# Patient Record
Sex: MALE | Race: WHITE | Employment: UNEMPLOYED | URBAN - METROPOLITAN AREA
[De-identification: names, ages, dates, MRNs, and addresses within clinical notes are randomized per-mention and may not be internally consistent; named-entity substitution may affect disease eponyms.]

---

## 2019-06-12 ENCOUNTER — OFFICE VISIT (OUTPATIENT)
Dept: INTERNAL MEDICINE CLINIC | Facility: CLINIC | Age: 39
End: 2019-06-12
Payer: COMMERCIAL

## 2019-06-12 VITALS
HEIGHT: 74 IN | WEIGHT: 249 LBS | TEMPERATURE: 98 F | DIASTOLIC BLOOD PRESSURE: 84 MMHG | HEART RATE: 85 BPM | SYSTOLIC BLOOD PRESSURE: 127 MMHG | BODY MASS INDEX: 31.95 KG/M2

## 2019-06-12 DIAGNOSIS — Z85.840 HISTORY OF MALIGNANT MELANOMA OF EYE: ICD-10-CM

## 2019-06-12 DIAGNOSIS — G47.30 SLEEP APNEA, UNSPECIFIED TYPE: ICD-10-CM

## 2019-06-12 DIAGNOSIS — Z00.00 ANNUAL PHYSICAL EXAM: Primary | ICD-10-CM

## 2019-06-12 DIAGNOSIS — G56.00 MILD CARPAL TUNNEL SYNDROME, UNSPECIFIED LATERALITY: ICD-10-CM

## 2019-06-12 DIAGNOSIS — Z97.0 EYE GLOBE PROSTHESIS: ICD-10-CM

## 2019-06-12 DIAGNOSIS — E66.9 OBESITY (BMI 30-39.9): ICD-10-CM

## 2019-06-12 PROBLEM — M51.26 LUMBAR HERNIATED DISC: Status: ACTIVE | Noted: 2019-06-12

## 2019-06-12 PROCEDURE — 99385 PREV VISIT NEW AGE 18-39: CPT | Performed by: INTERNAL MEDICINE

## 2019-06-12 NOTE — PROGRESS NOTES
HPI:    Patient ID: Jerry Hunt is a 41year old male. Chief Complaint: Physical (annual px)      HPI    Prior physician care: last saw physician 5 years ago. No active complaints, feels well.    Has episodes of apnea, wakes him at night, wakes 1-2 Gastrointestinal: Positive for heartburn (due to diet). Negative for abdominal pain, diarrhea, constipation, blood in stool and rectal pain. Endocrine: Negative for cold intolerance, heat intolerance, polydipsia, polyphagia and polyuria.    Genitourinary: Smoking status: Never Smoker      Smokeless tobacco: Never Used    Alcohol use: Not Currently    Drug use: Not Currently       No Known Allergies         Depression Screening (PHQ-2/PHQ-9): Over the LAST 2 WEEKS   Little interest or pleasure in doing t Mouth/Throat: Oropharynx is clear and moist. No oropharyngeal exudate. Eyes: Conjunctivae are normal. Right eye exhibits no discharge. Left eye exhibits no discharge. No scleral icterus.    Left Eye: EOM, PERRL; No vision or EOM in right eye   Neck: Southold Found - referral to sleep medicine for sleep study.  Recommended pt lose weight     4) History of malignant melanoma of RIGHT eye  5) Eye globe prosthesis- RIGHT  -     DERM - INTERNAL  Plan  - stable, requested he sign CADEN to provide records from [facility] for review Preventive measures and further education provided to patient in after visit summary. Potential medication side effects discussed. All questions answered. Patient understands and agrees to follow directions and advice.  Patient [facility] Scre[facility] tests and vaccines are an important part of managing your health. A screening test is done to find possible disorders or diseases in people who don't have any symptoms.  The goal is to find a disease early so lifestyle changes can be made and you Chickenpox (varicella) All men in this age group who have no record of this infection or vaccine 2 doses; the second dose should be given at least 4 weeks after the first dose   Hepatitis A Men at increased risk for infection – talk with your healthcare pr Sexually transmitted infection prevention Men who are sexually active At routine exams   Skin cancer Prevention of skin cancer in fair-skinned adults through age 25 At routine exams   1Those who are 25years of age, who are not up-to-date on their childhoo · Use non-sugar sweeteners. Stevia, aspartame, and sucralose can satisfy a sweet tooth without adding calories. · Swap out sugar-filled soda and other drinks. Buy sugar-free or low-calorie beverages. Remember water is always the best choice.   · Read label · Tofu, stir-fried without salt  · Unsalted fresh fruit and vegetables, or frozen or canned fruit and vegetables with no added salt  Stay away from:  · Lunch or deli meat that is cured or smoked  · Cheese  · Tomato juice and ketchup  · Canned vegetables, s · Half a cup cooked rice, pasta or cereal  Best choices: Whole grains and any grains high in fiber.  Vegetables  Servings: 4 to 5 a day  A serving is:  · 1 cup raw leafy vegetable  · Half a cup cut-up raw or cooked vegetable  · Half a cup vegetable juice  B Best choices: Dried fruit can be a satisfying sweet. Choose low-fat sweets.  And watch your serving sizes!      For more on the DASH eating plan, visit:  www.nhlbi.nih.gov/health/health-topics/topics/dash   Date Last Reviewed: 6/1/2016  © 5321-5513 [employer]

## 2019-06-12 NOTE — PATIENT INSTRUCTIONS
Prevention Guidelines, Men Ages 25 to 44  Screening tests and vaccines are an important part of managing your health. A screening test is done to find possible disorders or diseases in people who don't have any symptoms.  The goal is to find a disease ear Vaccines Who needs it How often   Chickenpox (varicella) All men in this age group who have no record of this infection or vaccine 2 doses; the second dose should be given at least 4 weeks after the first dose   Hepatitis A Men at increased risk for infect Sexually transmitted infection prevention Men who are sexually active At routine exams   Skin cancer Prevention of skin cancer in fair-skinned adults through age 25 At routine exams   1Those who are 25years of age, who are not up-to-date on their childhoo · Use non-sugar sweeteners. Stevia, aspartame, and sucralose can satisfy a sweet tooth without adding calories. · Swap out sugar-filled soda and other drinks. Buy sugar-free or low-calorie beverages. Remember water is always the best choice.   · Read label · Tofu, stir-fried without salt  · Unsalted fresh fruit and vegetables, or frozen or canned fruit and vegetables with no added salt  Stay away from:  · Lunch or deli meat that is cured or smoked  · Cheese  · Tomato juice and ketchup  · Canned vegetables, s · Half a cup cooked rice, pasta or cereal  Best choices: Whole grains and any grains high in fiber.  Vegetables  Servings: 4 to 5 a day  A serving is:  · 1 cup raw leafy vegetable  · Half a cup cut-up raw or cooked vegetable  · Half a cup vegetable juice  B Best choices: Dried fruit can be a satisfying sweet. Choose low-fat sweets.  And watch your serving sizes!      For more on the DASH eating plan, visit:  www.nhlbi.nih.gov/health/health-topics/topics/dash   Date Last Reviewed: 6/1/2016  © 4376-0090 The [employer]

## 2019-06-14 ENCOUNTER — LAB ENCOUNTER (OUTPATIENT)
Dept: LAB | Age: 39
End: 2019-06-14
Attending: INTERNAL MEDICINE
Payer: COMMERCIAL

## 2019-06-14 DIAGNOSIS — Z00.00 ANNUAL PHYSICAL EXAM: ICD-10-CM

## 2019-06-14 PROCEDURE — 36415 COLL VENOUS BLD VENIPUNCTURE: CPT

## 2019-06-14 PROCEDURE — 85025 COMPLETE CBC W/AUTO DIFF WBC: CPT

## 2019-06-14 PROCEDURE — 83036 HEMOGLOBIN GLYCOSYLATED A1C: CPT

## 2019-06-14 PROCEDURE — 82306 VITAMIN D 25 HYDROXY: CPT

## 2019-06-14 PROCEDURE — 80053 COMPREHEN METABOLIC PANEL: CPT

## 2019-06-14 PROCEDURE — 86803 HEPATITIS C AB TEST: CPT

## 2019-06-14 PROCEDURE — 80061 LIPID PANEL: CPT

## 2019-06-14 PROCEDURE — 87340 HEPATITIS B SURFACE AG IA: CPT

## 2019-06-19 DIAGNOSIS — E83.52 HYPERCALCEMIA: ICD-10-CM

## 2019-06-19 DIAGNOSIS — E78.1 HYPERTRIGLYCERIDEMIA: ICD-10-CM

## 2019-06-19 DIAGNOSIS — E55.9 VITAMIN D DEFICIENCY: Primary | ICD-10-CM

## 2019-07-24 ENCOUNTER — OFFICE VISIT (OUTPATIENT)
Dept: PULMONOLOGY | Facility: CLINIC | Age: 39
End: 2019-07-24
Payer: COMMERCIAL

## 2019-07-24 VITALS
DIASTOLIC BLOOD PRESSURE: 88 MMHG | RESPIRATION RATE: 18 BRPM | OXYGEN SATURATION: 99 % | HEIGHT: 74 IN | SYSTOLIC BLOOD PRESSURE: 133 MMHG | WEIGHT: 248 LBS | HEART RATE: 111 BPM | BODY MASS INDEX: 31.83 KG/M2

## 2019-07-24 DIAGNOSIS — G47.33 OSA (OBSTRUCTIVE SLEEP APNEA): Primary | ICD-10-CM

## 2019-07-24 PROCEDURE — 99203 OFFICE O/P NEW LOW 30 MIN: CPT | Performed by: INTERNAL MEDICINE

## 2019-07-24 NOTE — PROGRESS NOTES
[facility], [facility], [facility]    Report of Consultation    Caity Lunsford Patient Status:  Outpatient    1980 MRN [id number]   Location [address], [address] [place] [address] airway , Mallampati  class IV score   Nose : normal   Chest : CTA(B)   Heart : s1 s2 RRR no G/M   Ext : no edema   A, A,Ox 3  No focal deficit     Results:     Laboratory Data:  Lab Results   Component Value Date    WBC 6.7 06/14/2019    HGB 15.8 06/14/201

## 2019-08-13 ENCOUNTER — TELEPHONE (OUTPATIENT)
Dept: PULMONOLOGY | Facility: CLINIC | Age: 39
End: 2019-08-13

## 2019-08-13 DIAGNOSIS — G47.33 OSA (OBSTRUCTIVE SLEEP APNEA): Primary | ICD-10-CM

## 2019-08-13 NOTE — TELEPHONE ENCOUNTER
Patients insurance has denied the auth request for the in lab sleep study as not meeting medical policy criteria. An order for a home sleep study can be placed if you agree.      Thank you,  [facility]

## 2019-08-15 ENCOUNTER — TELEPHONE (OUTPATIENT)
Dept: PULMONOLOGY | Facility: CLINIC | Age: 39
End: 2019-08-15

## 2019-08-15 DIAGNOSIS — G47.33 OSA (OBSTRUCTIVE SLEEP APNEA): Primary | ICD-10-CM

## 2019-08-15 NOTE — TELEPHONE ENCOUNTER
----- Message from Jer Wolf MD sent at 8/14/2019  1:54 PM CDT -----  Hi Pulmo Team,    My brother's sleep study was denied by his insurance. Maybe he needs a home sleep study instead? Thank you!

## 2019-08-16 NOTE — TELEPHONE ENCOUNTER
Pt informed of new orders for home sleep study/denial of in lab sleep study and to call sleep center to schedule. Pt verbalized understanding and states he has sleep center's phone #.

## 2019-08-16 NOTE — TELEPHONE ENCOUNTER
Ashley Thomas with me to have home sleep study for Danita Brown   Please let mow know if any other problems to have home sleep study   Thanks

## 2019-11-29 ENCOUNTER — OFFICE VISIT (OUTPATIENT)
Dept: INTERNAL MEDICINE CLINIC | Facility: CLINIC | Age: 39
End: 2019-11-29
Payer: COMMERCIAL

## 2019-11-29 VITALS
SYSTOLIC BLOOD PRESSURE: 130 MMHG | BODY MASS INDEX: 32.85 KG/M2 | TEMPERATURE: 97 F | HEART RATE: 99 BPM | HEIGHT: 74 IN | DIASTOLIC BLOOD PRESSURE: 86 MMHG | WEIGHT: 256 LBS | RESPIRATION RATE: 12 BRPM

## 2019-11-29 DIAGNOSIS — M54.42 ACUTE LEFT-SIDED LOW BACK PAIN WITH LEFT-SIDED SCIATICA: Primary | ICD-10-CM

## 2019-11-29 RX ORDER — IBUPROFEN 800 MG/1
800 TABLET ORAL EVERY 6 HOURS PRN
COMMUNITY
End: 2019-12-09

## 2019-11-29 RX ORDER — CYCLOBENZAPRINE HCL 10 MG
10 TABLET ORAL NIGHTLY
Qty: 15 TABLET | Refills: 0 | Status: SHIPPED | OUTPATIENT
Start: 2019-11-29 | End: 2019-12-14

## 2019-11-29 RX ORDER — HYDROCODONE BITARTRATE AND ACETAMINOPHEN 5; 325 MG/1; MG/1
1 TABLET ORAL EVERY 6 HOURS PRN
Qty: 20 TABLET | Refills: 0 | Status: SHIPPED | OUTPATIENT
Start: 2019-11-29 | End: 2019-12-09

## 2019-11-29 NOTE — PROGRESS NOTES
HPI:    Patient ID: Jun Puga is a 42year old male. Low Back Pain   This is a new (pt complained of low back pain after bending down ) problem. The current episode started yesterday. The problem occurs constantly.  The problem has been waxing an Right eye exhibits no discharge. Left eye exhibits no discharge. No scleral icterus. Neck: Normal range of motion. Neck supple. No JVD present. No thyromegaly present. Cardiovascular: Normal rate, regular rhythm and normal heart sounds.    No murmur hea Referrals:  None       [facility][id number]

## 2019-12-02 ENCOUNTER — OFFICE VISIT (OUTPATIENT)
Dept: NEUROLOGY | Facility: CLINIC | Age: 39
End: 2019-12-02
Payer: COMMERCIAL

## 2019-12-02 ENCOUNTER — TELEPHONE (OUTPATIENT)
Dept: NEUROLOGY | Facility: CLINIC | Age: 39
End: 2019-12-02

## 2019-12-02 VITALS
RESPIRATION RATE: 16 BRPM | SYSTOLIC BLOOD PRESSURE: 134 MMHG | HEART RATE: 68 BPM | BODY MASS INDEX: 32.08 KG/M2 | WEIGHT: 250 LBS | HEIGHT: 74 IN | DIASTOLIC BLOOD PRESSURE: 84 MMHG

## 2019-12-02 DIAGNOSIS — M54.16 LEFT LUMBAR RADICULITIS: Primary | ICD-10-CM

## 2019-12-02 PROCEDURE — 99244 OFF/OP CNSLTJ NEW/EST MOD 40: CPT | Performed by: PHYSICAL MEDICINE & REHABILITATION

## 2019-12-02 RX ORDER — CELECOXIB 100 MG/1
100 CAPSULE ORAL 2 TIMES DAILY
Qty: 60 CAPSULE | Refills: 0 | Status: SHIPPED | OUTPATIENT
Start: 2019-12-02 | End: 2021-03-09 | Stop reason: ALTCHOICE

## 2019-12-02 RX ORDER — HYDROCODONE BITARTRATE AND ACETAMINOPHEN 7.5; 325 MG/1; MG/1
1 TABLET ORAL EVERY 8 HOURS PRN
Qty: 42 TABLET | Refills: 0 | Status: SHIPPED | OUTPATIENT
Start: 2019-12-04

## 2019-12-02 NOTE — PATIENT INSTRUCTIONS
Spend 5 minutes per day lying flat on your belly at least twice a day to take pressure off the discs of the lower back.

## 2019-12-02 NOTE — TELEPHONE ENCOUNTER
Dr. Jayme Cabrera,  Message below is stating that the pharmacy did not receive the Celebrex prescription.

## 2019-12-02 NOTE — TELEPHONE ENCOUNTER
Glenda for [facility] BS Online for authorization of approval for MRI L-spine wo cpt code 97329. Approval was given with Authorization Number: [id number] effective 12/02/19 to 01/16/20. Will call Pt. to inform.  Pt. informed of approval. He will call to schedule

## 2019-12-02 NOTE — H&P
Ban Nunez    History and Physical    Andrea Daniel Patient Status:  No patient class for patient encounter    1980 MRN [id number]   Location [facility]  Attending No att. providers found   [facility] Day # melanoma     Family History   Problem Relation Age of Onset   • Heart Disorder Father    • Hypertension Father      Social History:  Social History    Tobacco Use      Smoking status: Never Smoker      Smokeless tobacco: Never Used    Alcohol use: Not Curr for left lower back and leg pain. Right supine SLR + at 45 degrees for left sided lower back pain. Rest of testing deferred due to reproduction of symptoms with bilateral SLR. Neurological:   Strength: normal muscle bulk in b/l Le.  Mildly gives way with

## 2019-12-05 ENCOUNTER — HOSPITAL ENCOUNTER (OUTPATIENT)
Dept: MRI IMAGING | Age: 39
Discharge: HOME | End: 2019-12-05
Attending: PHYSICAL MEDICINE & REHABILITATION
Payer: COMMERCIAL

## 2019-12-05 DIAGNOSIS — M54.16 LEFT LUMBAR RADICULITIS: ICD-10-CM

## 2019-12-05 PROCEDURE — 72148 MRI LUMBAR SPINE W/O DYE: CPT | Performed by: PHYSICAL MEDICINE & REHABILITATION

## 2019-12-09 ENCOUNTER — TELEPHONE (OUTPATIENT)
Dept: ENDOCRINOLOGY CLINIC | Facility: CLINIC | Age: 39
End: 2019-12-09

## 2019-12-09 ENCOUNTER — OFFICE VISIT (OUTPATIENT)
Dept: NEUROLOGY | Facility: CLINIC | Age: 39
End: 2019-12-09
Payer: COMMERCIAL

## 2019-12-09 ENCOUNTER — TELEPHONE (OUTPATIENT)
Dept: NEUROLOGY | Facility: CLINIC | Age: 39
End: 2019-12-09

## 2019-12-09 ENCOUNTER — NURSE ONLY (OUTPATIENT)
Dept: ENDOCRINOLOGY CLINIC | Facility: CLINIC | Age: 39
End: 2019-12-09
Payer: COMMERCIAL

## 2019-12-09 VITALS
SYSTOLIC BLOOD PRESSURE: 118 MMHG | HEIGHT: 74 IN | HEART RATE: 88 BPM | WEIGHT: 250 LBS | DIASTOLIC BLOOD PRESSURE: 78 MMHG | RESPIRATION RATE: 16 BRPM | BODY MASS INDEX: 32.08 KG/M2

## 2019-12-09 DIAGNOSIS — M54.16 LEFT LUMBAR RADICULITIS: ICD-10-CM

## 2019-12-09 DIAGNOSIS — M51.27 HERNIATED NUCLEUS PULPOSUS, L5-S1, LEFT: Primary | ICD-10-CM

## 2019-12-09 PROCEDURE — 99214 OFFICE O/P EST MOD 30 MIN: CPT | Performed by: PHYSICAL MEDICINE & REHABILITATION

## 2019-12-09 RX ORDER — SEMAGLUTIDE 1.34 MG/ML
0.5 INJECTION, SOLUTION SUBCUTANEOUS WEEKLY
Qty: 1.5 ML | Refills: 2 | Status: SHIPPED | OUTPATIENT
Start: 2019-12-09 | End: 2020-02-05

## 2019-12-09 NOTE — TELEPHONE ENCOUNTER
Patient presented in clinic to meet with CDE and discuss low CHO diet. He is concerned about compliance.   Start Ozempic 0.25mg SQ weekly for one month then increase to 0.5mg SQ weekly,  Verbalized understanding of risks and benefits    Demonstrated inject

## 2019-12-09 NOTE — TELEPHONE ENCOUNTER
----- Message from Pam Snow DO sent at 12/6/2019 12:53 PM CST -----  Please let the patient know he has a mild disc bulge on the left side that passes close and is likely irritating the nerve.  He should follow up so we can discuss the results and marisol

## 2019-12-09 NOTE — PROGRESS NOTES
HPI:    Patient ID: Inda Lanes is a 42year old male. 51-year-old male presents for follow-up. Doing much better since last time I saw him.   He continues to experience soreness in the upper portion of the lumbar spine that radiates to the buttoc Normocephalic and atraumatic.    Eyes: Conjunctivae and EOM are normal. Musculoskeletal:      Comments: Lumbar range of motion: Pain with lumbar flexion and lumbar extension, one not worse than the other    Palpation: Tenderness to palpation upper left lumb interventional procedure at this time. If the symptoms worsen or fail to improve consider left S1 transforaminal epidural steroid injection under fluoroscopy. Continue current medications for now.   He can finish of the last 2 doses of the Flexeril, after

## 2019-12-10 ENCOUNTER — TELEPHONE (OUTPATIENT)
Dept: ENDOCRINOLOGY CLINIC | Facility: CLINIC | Age: 39
End: 2019-12-10

## 2019-12-10 NOTE — TELEPHONE ENCOUNTER
Current Outpatient Medications   Medication Sig Dispense Refill   • OZEMPIC, 0.25 OR 0.5 MG/DOSE, 2 MG/1.5ML Subcutaneous Solution Pen-injector Inject 0.5 mg into the skin once a week.  1.5 mL 2     PA request call [phone] ID# [id number]

## 2019-12-11 ENCOUNTER — OFFICE VISIT (OUTPATIENT)
Dept: SLEEP CENTER | Age: 39
End: 2019-12-11
Attending: INTERNAL MEDICINE
Payer: COMMERCIAL

## 2019-12-11 DIAGNOSIS — G47.33 OSA (OBSTRUCTIVE SLEEP APNEA): Primary | ICD-10-CM

## 2019-12-11 PROCEDURE — 95806 SLEEP STUDY UNATT&RESP EFFT: CPT

## 2019-12-11 NOTE — TELEPHONE ENCOUNTER
Contacted insurance. PA to be completed by fax form. Form sent to office this morning. Will complete once received.

## 2019-12-12 ENCOUNTER — MED REC SCAN ONLY (OUTPATIENT)
Dept: NEUROLOGY | Facility: CLINIC | Age: 39
End: 2019-12-12

## 2019-12-16 ENCOUNTER — OFFICE VISIT (OUTPATIENT)
Dept: PULMONOLOGY | Facility: CLINIC | Age: 39
End: 2019-12-16
Payer: COMMERCIAL

## 2019-12-16 VITALS
BODY MASS INDEX: 31.44 KG/M2 | HEART RATE: 93 BPM | WEIGHT: 245 LBS | DIASTOLIC BLOOD PRESSURE: 89 MMHG | HEIGHT: 74 IN | OXYGEN SATURATION: 97 % | SYSTOLIC BLOOD PRESSURE: 117 MMHG

## 2019-12-16 DIAGNOSIS — G47.33 OSA (OBSTRUCTIVE SLEEP APNEA): Primary | ICD-10-CM

## 2019-12-16 PROCEDURE — 99212 OFFICE O/P EST SF 10 MIN: CPT | Performed by: INTERNAL MEDICINE

## 2019-12-16 NOTE — PROCEDURES
[facility]  Accredited by the [place] of Sleep Medicine (AASM)    PATIENT'S NAME: Anamaria Gnog   ATTENDING PHYSICIAN: Misha Dorman MD   REFERRING PHYSICIAN: Ade Perez.  Ciara Manzano MD   PATIENT ACCOUNT #: [id number] LOCATION:  alcohol. 4.   Avoid sedating drug. 5.   Patient should not drive if at all sleepy. Please do not hesitate to contact me if there is any question whatsoever regarding interpretation of this study.     Dictated By Yfn Zimmer MD  d: 12/14/2019 12

## 2019-12-16 NOTE — TELEPHONE ENCOUNTER
PA for Ozempic has been denied. Must meet criteria: has type 2 diabetes and tried/failed metformin, sulfonylurea or insulin.

## 2019-12-16 NOTE — PROGRESS NOTES
HPI:    Patient ID: Ascencion Wick is a 42year old male.     HPI  Overall doing well still with symptoms of CHERRY  Daytime sleepiness and fatigue and history of snoring at night  No other symptoms like a chest pain or shortness of breath or edema lower ex cycles and good sleep hygiene   - diet and exercise and weight reduction  -avoid sedative /narcotics /alcohol   - avoid driving of working on heavy machinery if sleepy     Follow-up with me in 2 to 3 months                      Meds This Visit:  Requested

## 2020-01-02 ENCOUNTER — MED REC SCAN ONLY (OUTPATIENT)
Dept: NEUROLOGY | Facility: CLINIC | Age: 40
End: 2020-01-02

## 2020-02-05 ENCOUNTER — OFFICE VISIT (OUTPATIENT)
Dept: SURGERY | Facility: CLINIC | Age: 40
End: 2020-02-05
Payer: COMMERCIAL

## 2020-02-05 VITALS
HEIGHT: 74 IN | WEIGHT: 238 LBS | OXYGEN SATURATION: 100 % | HEART RATE: 76 BPM | DIASTOLIC BLOOD PRESSURE: 83 MMHG | SYSTOLIC BLOOD PRESSURE: 121 MMHG | BODY MASS INDEX: 30.54 KG/M2

## 2020-02-05 DIAGNOSIS — E78.2 HYPERCHOLESTEROLEMIA WITH HYPERTRIGLYCERIDEMIA: ICD-10-CM

## 2020-02-05 DIAGNOSIS — Z99.89 OSA ON CPAP: Primary | ICD-10-CM

## 2020-02-05 DIAGNOSIS — G47.33 OSA ON CPAP: Primary | ICD-10-CM

## 2020-02-05 DIAGNOSIS — E66.9 OBESITY (BMI 30-39.9): ICD-10-CM

## 2020-02-05 PROCEDURE — 99203 OFFICE O/P NEW LOW 30 MIN: CPT | Performed by: INTERNAL MEDICINE

## 2020-02-08 ENCOUNTER — MED REC SCAN ONLY (OUTPATIENT)
Dept: NEUROLOGY | Facility: CLINIC | Age: 40
End: 2020-02-08

## 2020-02-08 ENCOUNTER — OCC HEALTH (OUTPATIENT)
Dept: NEUROLOGY | Facility: CLINIC | Age: 40
End: 2020-02-08

## 2020-02-08 NOTE — PROGRESS NOTES
Physical therapy note summary signed and faxed back to Mike Mederos [phone] on 1/28/2020.  Sent to scanning 2/8/2020

## 2020-02-10 ENCOUNTER — TELEPHONE (OUTPATIENT)
Dept: INTERNAL MEDICINE CLINIC | Facility: CLINIC | Age: 40
End: 2020-02-10

## 2020-02-11 NOTE — TELEPHONE ENCOUNTER
CSS- Per Dr. Peter Glaser Please call pt and schedule appt for px, MD will order labs at that time. Thanks!            Author: Sailaja Pinto MD Service: Jose Alberto Simons Author Type: Physician   Filed: 2/5/2020  4:09 PM Encounter Date: 2/5/2020 Status: Signed   : Pepper Daniel

## 2020-02-11 NOTE — TELEPHONE ENCOUNTER
Called patient, unable to leave message per patient request on chart. My Chart message was sent to patient.

## 2020-02-18 ENCOUNTER — OFFICE VISIT (OUTPATIENT)
Dept: INTERNAL MEDICINE CLINIC | Facility: CLINIC | Age: 40
End: 2020-02-18
Payer: COMMERCIAL

## 2020-02-18 ENCOUNTER — APPOINTMENT (OUTPATIENT)
Dept: LAB | Age: 40
End: 2020-02-18
Attending: INTERNAL MEDICINE
Payer: COMMERCIAL

## 2020-02-18 VITALS
WEIGHT: 237 LBS | DIASTOLIC BLOOD PRESSURE: 74 MMHG | BODY MASS INDEX: 30.42 KG/M2 | HEART RATE: 92 BPM | TEMPERATURE: 97 F | SYSTOLIC BLOOD PRESSURE: 119 MMHG | HEIGHT: 74 IN

## 2020-02-18 DIAGNOSIS — Z00.00 ANNUAL PHYSICAL EXAM: ICD-10-CM

## 2020-02-18 DIAGNOSIS — Z23 NEED FOR INFLUENZA VACCINATION: ICD-10-CM

## 2020-02-18 DIAGNOSIS — E78.2 HYPERCHOLESTEROLEMIA WITH HYPERTRIGLYCERIDEMIA: ICD-10-CM

## 2020-02-18 DIAGNOSIS — Z00.00 ANNUAL PHYSICAL EXAM: Primary | ICD-10-CM

## 2020-02-18 LAB
ALBUMIN SERPL-MCNC: 3.8 G/DL (ref 3.4–5)
ALBUMIN/GLOB SERPL: 1.1 {RATIO} (ref 1–2)
ALP LIVER SERPL-CCNC: 52 U/L (ref 45–117)
ALT SERPL-CCNC: 28 U/L (ref 16–61)
ANION GAP SERPL CALC-SCNC: 3 MMOL/L (ref 0–18)
AST SERPL-CCNC: 10 U/L (ref 15–37)
BILIRUB SERPL-MCNC: 0.4 MG/DL (ref 0.1–2)
BUN BLD-MCNC: 18 MG/DL (ref 7–18)
BUN/CREAT SERPL: 17.3 (ref 10–20)
CALCIUM BLD-MCNC: 9.4 MG/DL (ref 8.5–10.1)
CHLORIDE SERPL-SCNC: 107 MMOL/L (ref 98–112)
CHOLEST SMN-MCNC: 202 MG/DL (ref ?–200)
CO2 SERPL-SCNC: 30 MMOL/L (ref 21–32)
CREAT BLD-MCNC: 1.04 MG/DL (ref 0.7–1.3)
DEPRECATED RDW RBC AUTO: 41.7 FL (ref 35.1–46.3)
ERYTHROCYTE [DISTWIDTH] IN BLOOD BY AUTOMATED COUNT: 12.7 % (ref 11–15)
EST. AVERAGE GLUCOSE BLD GHB EST-MCNC: 97 MG/DL (ref 68–126)
GLOBULIN PLAS-MCNC: 3.5 G/DL (ref 2.8–4.4)
GLUCOSE BLD-MCNC: 95 MG/DL (ref 70–99)
HBA1C MFR BLD HPLC: 5 % (ref ?–5.7)
HCT VFR BLD AUTO: 45.4 % (ref 39–53)
HDLC SERPL-MCNC: 29 MG/DL (ref 40–59)
HGB BLD-MCNC: 15.2 G/DL (ref 13–17.5)
LDLC SERPL CALC-MCNC: 136 MG/DL (ref ?–100)
M PROTEIN MFR SERPL ELPH: 7.3 G/DL (ref 6.4–8.2)
MCH RBC QN AUTO: 30.2 PG (ref 26–34)
MCHC RBC AUTO-ENTMCNC: 33.5 G/DL (ref 31–37)
MCV RBC AUTO: 90.1 FL (ref 80–100)
NONHDLC SERPL-MCNC: 173 MG/DL (ref ?–130)
OSMOLALITY SERPL CALC.SUM OF ELEC: 292 MOSM/KG (ref 275–295)
PATIENT FASTING Y/N/NP: YES
PATIENT FASTING Y/N/NP: YES
PLATELET # BLD AUTO: 168 10(3)UL (ref 150–450)
POTASSIUM SERPL-SCNC: 4.8 MMOL/L (ref 3.5–5.1)
RBC # BLD AUTO: 5.04 X10(6)UL (ref 4.3–5.7)
SODIUM SERPL-SCNC: 140 MMOL/L (ref 136–145)
TRIGL SERPL-MCNC: 186 MG/DL (ref 30–149)
TSI SER-ACNC: 1.93 MIU/ML (ref 0.36–3.74)
VLDLC SERPL CALC-MCNC: 37 MG/DL (ref 0–30)
WBC # BLD AUTO: 5.4 X10(3) UL (ref 4–11)

## 2020-02-18 PROCEDURE — 85027 COMPLETE CBC AUTOMATED: CPT

## 2020-02-18 PROCEDURE — 80053 COMPREHEN METABOLIC PANEL: CPT

## 2020-02-18 PROCEDURE — 82306 VITAMIN D 25 HYDROXY: CPT

## 2020-02-18 PROCEDURE — 84443 ASSAY THYROID STIM HORMONE: CPT

## 2020-02-18 PROCEDURE — 36415 COLL VENOUS BLD VENIPUNCTURE: CPT

## 2020-02-18 PROCEDURE — 99395 PREV VISIT EST AGE 18-39: CPT | Performed by: INTERNAL MEDICINE

## 2020-02-18 PROCEDURE — 80061 LIPID PANEL: CPT

## 2020-02-18 PROCEDURE — 90686 IIV4 VACC NO PRSV 0.5 ML IM: CPT | Performed by: INTERNAL MEDICINE

## 2020-02-18 PROCEDURE — 83036 HEMOGLOBIN GLYCOSYLATED A1C: CPT

## 2020-02-18 PROCEDURE — 90471 IMMUNIZATION ADMIN: CPT | Performed by: INTERNAL MEDICINE

## 2020-02-18 NOTE — PROGRESS NOTES
History of Present Illness   Patient ID: Sherri Pearce is a 42year old male.   Chief Complaint: Physical      Sherri Pearce is a pleasant 42year old male who presents for annual physical exam.   1.  He is currently seeing bariatrics Dr. Daisy Benavidez, he Constitutional: He is oriented to person, place, and time. He appears well-developed and well-nourished. No distress. HENT:   Head: Normocephalic and atraumatic.    Right Ear: Hearing, tympanic membrane, external ear and ear canal normal.   Left Ear: Hear TP 7.7 06/14/2019    ALB 4.1 06/14/2019    GLOBULIN 3.6 06/14/2019     06/14/2019    K 4.4 06/14/2019     06/14/2019    CO2 28.0 06/14/2019     Lab Results   Component Value Date     06/14/2019    A1C 5.6 06/14/2019     Lab Results   Co Medication Sig Dispense Refill   • Cyclobenzaprine HCl (FLEXERIL OR) Take by mouth. • HYDROcodone-acetaminophen 7.5-325 MG Oral Tab Take 1 tablet by mouth every 8 (eight) hours as needed for Pain.  (Patient not taking: Reported on 12/16/2019 ) 42 tablet Patient understands and agrees to follow directions and advice.       ----------------------------------------- PATIENT INSTRUCTIONS-----------------------------------------     Patient Instructions       Prevention Guidelines, Men Ages 25 to 44  Screening Tuberculosis Men at increased risk for infection – talk with your healthcare provider Check with your healthcare provider   Vision All men in this age group Every 5 to 8 years if no risk factors for eye disease   Vaccines Who needs it How often   Chickenp Diet and exercise Overweight or obese people When diagnosed, and then at routine exams   Use of tobacco and the health effects it can cause All men in this age group Every visit   Sexually transmitted infection prevention Men who are sexually active At rou

## 2020-02-19 LAB — 25(OH)D3 SERPL-MCNC: 32.7 NG/ML (ref 30–100)

## 2021-03-09 ENCOUNTER — OFFICE VISIT (OUTPATIENT)
Dept: INTERNAL MEDICINE CLINIC | Facility: CLINIC | Age: 41
End: 2021-03-09
Payer: COMMERCIAL

## 2021-03-09 VITALS
SYSTOLIC BLOOD PRESSURE: 139 MMHG | DIASTOLIC BLOOD PRESSURE: 90 MMHG | HEIGHT: 74 IN | WEIGHT: 270 LBS | BODY MASS INDEX: 34.65 KG/M2 | TEMPERATURE: 97 F | HEART RATE: 108 BPM

## 2021-03-09 DIAGNOSIS — M79.641 PAIN IN BOTH HANDS: ICD-10-CM

## 2021-03-09 DIAGNOSIS — G62.9 NEUROPATHY: ICD-10-CM

## 2021-03-09 DIAGNOSIS — Z00.00 ANNUAL PHYSICAL EXAM: Primary | ICD-10-CM

## 2021-03-09 DIAGNOSIS — M79.89 BILATERAL HAND SWELLING: ICD-10-CM

## 2021-03-09 DIAGNOSIS — G56.00 MILD CARPAL TUNNEL SYNDROME, UNSPECIFIED LATERALITY: ICD-10-CM

## 2021-03-09 DIAGNOSIS — M79.642 PAIN IN BOTH HANDS: ICD-10-CM

## 2021-03-09 PROCEDURE — 3008F BODY MASS INDEX DOCD: CPT | Performed by: INTERNAL MEDICINE

## 2021-03-09 PROCEDURE — 99395 PREV VISIT EST AGE 18-39: CPT | Performed by: INTERNAL MEDICINE

## 2021-03-09 PROCEDURE — 3075F SYST BP GE 130 - 139MM HG: CPT | Performed by: INTERNAL MEDICINE

## 2021-03-09 PROCEDURE — 3080F DIAST BP >= 90 MM HG: CPT | Performed by: INTERNAL MEDICINE

## 2021-03-09 NOTE — PROGRESS NOTES
History of Present Illness   Patient ID: Mohinder Del Valle is a 47year old male. Chief Complaint: Physical      Mohinder Del Valle is a pleasant 47year old male who presents for annual physical exam. Mohinder Del Valle is doing well today.   Both hands bot Rate and Rhythm: Normal rate. Pulses:           Radial pulses are 2+ on the right side and 2+ on the left side. Heart sounds: Normal heart sounds. Pulmonary:      Effort: No respiratory distress.    Abdominal:      Palpations: Abdomen is so 202 (H) 02/18/2020    TRIG 186 (H) 02/18/2020    HDL 29 (L) 02/18/2020     (H) 02/18/2020    VLDL 37 (H) 02/18/2020    TCHDLRATIO 4.6 02/13/2015    [place] 173 (H) 02/18/2020     The ASCVD Risk score (Katrina Ingram, et al., 2013) failed to calculate f COMP METABOLIC PANEL (14); Future  - HEMOGLOBIN A1C; Future  - LIPID PANEL; Future    2. Pain in both hands  - XR HAND (2 VIEWS), RIGHT (CPT=73120); Future  - XR HAND (2 VIEWS), LEFT (CPT=73120); Future  - Diclofenac Sodium 1 % External Gel;  Apply 2 g topi and advice. ----------------------------------------- PATIENT INSTRUCTIONS-----------------------------------------     Patient Instructions       1.  Please purchase a blood pressure monitor, if you don't already own one, and record your blood pressur vessel, the blood moves smoothly through the vessel and puts normal pressure on the vessel walls. High blood pressure occurs when blood pushes too hard against artery walls.  This causes damage to the artery walls and then the formation of scar tissue over 70). The top number is the pressure of blood against the artery walls during a heartbeat (systolic). The bottom number is the pressure of blood against artery walls between heartbeats (diastolic).  Talk with your healthcare provider to find out what yo · Control stress. Stress makes your heart work harder and beat faster. Controlling stress helps you control your blood pressure. Facts about high blood pressure  · Feeling OK does not mean that blood pressure is under control.  Likewise, feeling bad doesn’ Blood pressure is categorized as normal, elevated, or stage 1 or stage 2 high blood pressure:  · Normal blood pressure is systolic of less than 739 and diastolic of less than 80 (120/80)  · Elevated blood pressure is systolic of 122 to 785 and diastolic le cocaine could be lethal for someone with high blood pressure. Never take these. · Limit how much caffeine you drink. Or switch to noncaffeinated beverages. · Stop smoking. If you are a long-time smoker, this can be hard.  Enroll in a stop-smoking program time, and blood pressure reading. · Take the record with you to your next appointment. If your blood pressure monitor has a built-in memory, simply take the monitor with you to your next appointment.   · Call your provider if you have several high readings number. This is the pressure when the heart contracts. Diastolic blood pressure is the lower number. This is the pressure when the heart relaxes between beats.   Blood pressure is categorized as normal, elevated, or stage 1 or stage 2 high blood pressure: Use stairs instead of the elevator. · When you can, walk or bike instead of driving. · [place] leaves, garden, or do household repairs.   · Be active at a moderate to vigorous level of physical activity for at least 40 minutes for a minimum of 3 to 4 days a cooked without salt  · Tofu, stir-fried without salt  · Unsalted fresh fruit and vegetables, or frozen or canned fruit and vegetables with no added salt  Stay away from:  · Lunch or deli meat that is cured or smoked  · Cheese  · Tomato juice and ketchup  ·

## 2021-03-09 NOTE — PATIENT INSTRUCTIONS
1. Please purchase a blood pressure monitor, if you don't already own one, and record your blood pressure and heart rate 1-2 times daily on the provided log.  The more values you provide at the end of the month the easier it will be to adjust your medic hard against artery walls. This causes damage to the artery walls and then the formation of scar tissue as it heals. This makes the arteries stiff and weak. Plaque sticks to the scarred tissue narrowing and hardening the arteries.  High blood pressure also artery walls between heartbeats (diastolic). Talk with your healthcare provider to find out what your blood pressure goals should be. Controlling blood pressure  If your blood pressure is too high, work with your doctor on a plan for lowering it.  Below a pressure  · Feeling OK does not mean that blood pressure is under control. Likewise, feeling bad doesn’t mean it’s out of control. The only way to know for sure is to check your pressure regularly.   · Medicine is only one part of controlling high blood pre diastolic of less than 80 (120/80)  · Elevated blood pressure is systolic of 265 to 689 and diastolic less than 80  · Stage 1 high blood pressure is systolic is 148 to 548 or diastolic between 80 to 89  · Stage 2 high blood pressure is when systolic is 892 beverages. · Stop smoking. If you are a long-time smoker, this can be hard. Enroll in a stop-smoking program to make it more likely that you will succeed. Talk with your provider about ways to quit. · Learn how to handle stress better.  This is an importa simply take the monitor with you to your next appointment. · Call your provider if you have several high readings. Don't be frightened by a single high reading, but if you get several high readings, check in with your healthcare provider.   · Note: When bl relaxes between beats.   Blood pressure is categorized as normal, elevated, or stage 1 or stage 2 high blood pressure:  · Normal blood pressure is systolic of less than 106 and diastolic of less than 80 (120/80)  · Elevated blood pressure is systolic of 830 Be active at a moderate to vigorous level of physical activity for at least 40 minutes for a minimum of 3 to 4 days a week. Manage stress  · Make time to relax and enjoy life. Find time to laugh.   · Communicate your concerns with your loved ones and yo with no added salt  Stay away from:  · Lunch or deli meat that is cured or smoked  · Cheese  · Tomato juice and ketchup  · Canned vegetables, soups, and fish not labeled as no-salt-added or reduced sodium  · Packaged gravies and sauces  · Olives, pickles,

## 2021-03-12 ENCOUNTER — LAB ENCOUNTER (OUTPATIENT)
Dept: LAB | Age: 41
End: 2021-03-12
Attending: INTERNAL MEDICINE
Payer: COMMERCIAL

## 2021-03-12 ENCOUNTER — HOSPITAL ENCOUNTER (OUTPATIENT)
Dept: GENERAL RADIOLOGY | Age: 41
Discharge: HOME | End: 2021-03-12
Attending: INTERNAL MEDICINE
Payer: COMMERCIAL

## 2021-03-12 DIAGNOSIS — M79.641 PAIN IN BOTH HANDS: ICD-10-CM

## 2021-03-12 DIAGNOSIS — M79.89 BILATERAL HAND SWELLING: ICD-10-CM

## 2021-03-12 DIAGNOSIS — M79.642 PAIN IN BOTH HANDS: ICD-10-CM

## 2021-03-12 DIAGNOSIS — Z00.00 ANNUAL PHYSICAL EXAM: ICD-10-CM

## 2021-03-12 DIAGNOSIS — G62.9 NEUROPATHY: ICD-10-CM

## 2021-03-12 LAB
ALBUMIN SERPL-MCNC: 4.1 G/DL (ref 3.4–5)
ALBUMIN/GLOB SERPL: 1.2 {RATIO} (ref 1–2)
ALP LIVER SERPL-CCNC: 64 U/L
ALT SERPL-CCNC: 55 U/L
ANION GAP SERPL CALC-SCNC: 1 MMOL/L (ref 0–18)
AST SERPL-CCNC: 20 U/L (ref 15–37)
BILIRUB SERPL-MCNC: 0.5 MG/DL (ref 0.1–2)
BUN BLD-MCNC: 16 MG/DL (ref 7–18)
BUN/CREAT SERPL: 15.1 (ref 10–20)
CALCIUM BLD-MCNC: 9.1 MG/DL (ref 8.5–10.1)
CHLORIDE SERPL-SCNC: 107 MMOL/L (ref 98–112)
CHOLEST SMN-MCNC: 202 MG/DL (ref ?–200)
CO2 SERPL-SCNC: 30 MMOL/L (ref 21–32)
CREAT BLD-MCNC: 1.06 MG/DL
DEPRECATED RDW RBC AUTO: 43.5 FL (ref 35.1–46.3)
ERYTHROCYTE [DISTWIDTH] IN BLOOD BY AUTOMATED COUNT: 13 % (ref 11–15)
EST. AVERAGE GLUCOSE BLD GHB EST-MCNC: 123 MG/DL (ref 68–126)
GLOBULIN PLAS-MCNC: 3.3 G/DL (ref 2.8–4.4)
GLUCOSE BLD-MCNC: 115 MG/DL (ref 70–99)
HBA1C MFR BLD HPLC: 5.9 % (ref ?–5.7)
HCT VFR BLD AUTO: 46.8 %
HDLC SERPL-MCNC: 37 MG/DL (ref 40–59)
HGB BLD-MCNC: 15.4 G/DL
LDLC SERPL CALC-MCNC: 91 MG/DL (ref ?–100)
M PROTEIN MFR SERPL ELPH: 7.4 G/DL (ref 6.4–8.2)
MCH RBC QN AUTO: 30.1 PG (ref 26–34)
MCHC RBC AUTO-ENTMCNC: 32.9 G/DL (ref 31–37)
MCV RBC AUTO: 91.4 FL
NONHDLC SERPL-MCNC: 165 MG/DL (ref ?–130)
OSMOLALITY SERPL CALC.SUM OF ELEC: 288 MOSM/KG (ref 275–295)
PATIENT FASTING Y/N/NP: YES
PATIENT FASTING Y/N/NP: YES
PLATELET # BLD AUTO: 223 10(3)UL (ref 150–450)
POTASSIUM SERPL-SCNC: 4.4 MMOL/L (ref 3.5–5.1)
RBC # BLD AUTO: 5.12 X10(6)UL
SODIUM SERPL-SCNC: 138 MMOL/L (ref 136–145)
TRIGL SERPL-MCNC: 370 MG/DL (ref 30–149)
TSI SER-ACNC: 2.22 MIU/ML (ref 0.36–3.74)
VIT B12 SERPL-MCNC: 342 PG/ML (ref 193–986)
VLDLC SERPL CALC-MCNC: 74 MG/DL (ref 0–30)
WBC # BLD AUTO: 8.7 X10(3) UL (ref 4–11)

## 2021-03-12 PROCEDURE — 84443 ASSAY THYROID STIM HORMONE: CPT

## 2021-03-12 PROCEDURE — 80053 COMPREHEN METABOLIC PANEL: CPT

## 2021-03-12 PROCEDURE — 73130 X-RAY EXAM OF HAND: CPT | Performed by: INTERNAL MEDICINE

## 2021-03-12 PROCEDURE — 36415 COLL VENOUS BLD VENIPUNCTURE: CPT

## 2021-03-12 PROCEDURE — 82607 VITAMIN B-12: CPT

## 2021-03-12 PROCEDURE — 80061 LIPID PANEL: CPT

## 2021-03-12 PROCEDURE — 85027 COMPLETE CBC AUTOMATED: CPT

## 2021-03-12 PROCEDURE — 83036 HEMOGLOBIN GLYCOSYLATED A1C: CPT

## 2021-03-15 PROBLEM — R73.03 PREDIABETES: Status: ACTIVE | Noted: 2021-03-15

## 2021-03-22 ENCOUNTER — OFFICE VISIT (OUTPATIENT)
Dept: INTERNAL MEDICINE CLINIC | Facility: CLINIC | Age: 41
End: 2021-03-22
Payer: COMMERCIAL

## 2021-03-22 VITALS
HEIGHT: 74 IN | TEMPERATURE: 98 F | HEART RATE: 90 BPM | SYSTOLIC BLOOD PRESSURE: 143 MMHG | DIASTOLIC BLOOD PRESSURE: 94 MMHG | WEIGHT: 268.63 LBS | BODY MASS INDEX: 34.48 KG/M2

## 2021-03-22 DIAGNOSIS — Z82.49 FAMILY HISTORY OF EARLY CAD: ICD-10-CM

## 2021-03-22 DIAGNOSIS — G56.03 CARPAL TUNNEL SYNDROME ON BOTH SIDES: ICD-10-CM

## 2021-03-22 DIAGNOSIS — I10 HYPERTENSION GOAL BP (BLOOD PRESSURE) < 130/80: ICD-10-CM

## 2021-03-22 DIAGNOSIS — E78.2 MIXED HYPERLIPIDEMIA: ICD-10-CM

## 2021-03-22 DIAGNOSIS — E78.1 HYPERTRIGLYCERIDEMIA: Primary | ICD-10-CM

## 2021-03-22 PROBLEM — M79.641 PAIN IN BOTH HANDS: Status: RESOLVED | Noted: 2021-03-09 | Resolved: 2021-03-22

## 2021-03-22 PROBLEM — M79.642 PAIN IN BOTH HANDS: Status: RESOLVED | Noted: 2021-03-09 | Resolved: 2021-03-22

## 2021-03-22 PROCEDURE — 99214 OFFICE O/P EST MOD 30 MIN: CPT | Performed by: INTERNAL MEDICINE

## 2021-03-22 PROCEDURE — 3080F DIAST BP >= 90 MM HG: CPT | Performed by: INTERNAL MEDICINE

## 2021-03-22 PROCEDURE — 3008F BODY MASS INDEX DOCD: CPT | Performed by: INTERNAL MEDICINE

## 2021-03-22 PROCEDURE — 3077F SYST BP >= 140 MM HG: CPT | Performed by: INTERNAL MEDICINE

## 2021-03-22 RX ORDER — ICOSAPENT ETHYL 1000 MG/1
2 CAPSULE ORAL 2 TIMES DAILY
Qty: 360 CAPSULE | Refills: 1 | Status: SHIPPED | OUTPATIENT
Start: 2021-03-22 | End: 2021-05-28

## 2021-03-22 RX ORDER — ROSUVASTATIN CALCIUM 10 MG/1
10 TABLET, COATED ORAL NIGHTLY
Qty: 90 TABLET | Refills: 1 | Status: SHIPPED | OUTPATIENT
Start: 2021-03-22 | End: 2021-05-28

## 2021-03-22 RX ORDER — TELMISARTAN 40 MG/1
40 TABLET ORAL NIGHTLY
Qty: 90 TABLET | Refills: 1 | Status: SHIPPED | OUTPATIENT
Start: 2021-03-22 | End: 2021-04-26

## 2021-03-22 NOTE — PATIENT INSTRUCTIONS
1. Please purchase a blood pressure monitor, if you don't already own one, and record your blood pressure and heart rate 1-2 times daily on the provided log.  The more values you provide at the end of the month the easier it will be to adjust your medic hard against artery walls. This causes damage to the artery walls and then the formation of scar tissue as it heals. This makes the arteries stiff and weak. Plaque sticks to the scarred tissue narrowing and hardening the arteries.  High blood pressure also artery walls between heartbeats (diastolic). Talk with your healthcare provider to find out what your blood pressure goals should be. Controlling blood pressure  If your blood pressure is too high, work with your doctor on a plan for lowering it.  Below a pressure  · Feeling OK does not mean that blood pressure is under control. Likewise, feeling bad doesn’t mean it’s out of control. The only way to know for sure is to check your pressure regularly.   · Medicine is only one part of controlling high blood pre diastolic of less than 80 (120/80)  · Elevated blood pressure is systolic of 741 to 020 and diastolic less than 80  · Stage 1 high blood pressure is systolic is 885 to 907 or diastolic between 80 to 89  · Stage 2 high blood pressure is when systolic is 739 beverages. · Stop smoking. If you are a long-time smoker, this can be hard. Enroll in a stop-smoking program to make it more likely that you will succeed. Talk with your provider about ways to quit. · Learn how to handle stress better.  This is an importa simply take the monitor with you to your next appointment. · Call your provider if you have several high readings. Don't be frightened by a single high reading, but if you get several high readings, check in with your healthcare provider.   · Note: When bl relaxes between beats.   Blood pressure is categorized as normal, elevated, or stage 1 or stage 2 high blood pressure:  · Normal blood pressure is systolic of less than 634 and diastolic of less than 80 (120/80)  · Elevated blood pressure is systolic of 486 Be active at a moderate to vigorous level of physical activity for at least 40 minutes for a minimum of 3 to 4 days a week. Manage stress  · Make time to relax and enjoy life. Find time to laugh.   · Communicate your concerns with your loved ones and yo with no added salt  Stay away from:  · Lunch or deli meat that is cured or smoked  · Cheese  · Tomato juice and ketchup  · Canned vegetables, soups, and fish not labeled as no-salt-added or reduced sodium  · Packaged gravies and sauces  · Olives, pickles, swelling of the face, lips, or tongue  · dark urine  · fever  · joint pain  · muscle cramps, pain  · redness, blistering, peeling or loosening of the skin, including inside the mouth  · trouble passing urine or change in the amount of urine  · unusually we check-ups. You may need regular tests to make sure your liver is working properly. Your health care professional may tell you to stop taking this medicine if you develop muscle problems.  If your muscle problems do not go away after stopping this medicine, to treat high triglyceride levels. How should I use this medicine? Take this medicine by mouth with a glass of water. Follow the directions on the prescription label. Take this medicine with food. Do not cut, crush or chew this medicine.  Take your medici ethyl, fish, shellfish, other medicines, foods, dyes, or preservatives  · pregnant or trying to get pregnant  · breast-feeding  What should I watch for while using this medicine? You may need blood work done while you are taking this medicine.   Follow juli g swelling of the face, lips, or tongue)  · high potassium levels (chest pain; fast, irregular heartbeat; muscle weakness)  · kidney injury (trouble passing urine or change in the amount of urine)  · low blood pressure (dizziness; feeling faint or lightheade might be pregnant. There is a potential for serious side effects and harm to an unborn child, particularly in the second or third trimester. Talk to your health care provider for more information. You may get dizzy.  Do not drive, use machinery, or do anyt

## 2021-03-22 NOTE — PROGRESS NOTES
History of Present Illness   Patient ID: Clem Briceño is a 47year old male. Chief Complaint: Hypertension (fu, labs/medication)      HPI   1. He has hypertension that is uncontrolled both in office and at home.   He has been having some headaches bu General: No swelling. Normal range of motion. Cervical back: Normal range of motion and neck supple. Skin:     General: Skin is warm. Neurological:      General: No focal deficit present.       Mental Status: He is alert and oriented to person, dex nightly, side effects medication discussed. If additional agent is needed we discussed using amlodipine as an adjunct. Recommend he take log of his blood pressures and we will follow-up monthly until blood pressures controlled. Patient agreeable.   Jessi Climes following reasons:     The 2013 ASCVD risk score is only valid for ages 36 to 78    Medical History    Reviewed Active Problems:  Patient Active Problem List    Family history of early CAD      Hypertriglyceridemia      Mixed hyperlipidemia      Hypertensio pressure and heart rate 1-2 times daily on the provided log. The more values you provide at the end of the month the easier it will be to adjust your medications, if necessary.     2. You can purchase a simple but good quality blood pressure monitoring mach tissue as it heals. This makes the arteries stiff and weak. Plaque sticks to the scarred tissue narrowing and hardening the arteries. High blood pressure also causes your heart to work harder to get blood out to the body.  High blood pressure raises your ri your blood pressure goals should be. Controlling blood pressure  If your blood pressure is too high, work with your doctor on a plan for lowering it. Below are steps you can take that will help lower your blood pressure. · Choose heart-healthy foods.  Ea doesn’t mean it’s out of control. The only way to know for sure is to check your pressure regularly. · Medicine is only one part of controlling high blood pressure. You also need to manage your weight, get regular exercise, and adjust your eating habits. diastolic less than 80  · Stage 1 high blood pressure is systolic is 802 to 607 or diastolic between 80 to 89  · Stage 2 high blood pressure is when systolic is 731 or higher or the diastolic is 90 or higher  Uncontrolled high blood pressure can cause seri program to make it more likely that you will succeed. Talk with your provider about ways to quit. · Learn how to handle stress better. This is an important part of any program to lower blood pressure. Learn ways to relax.  These include meditation, yoga, a readings. Don't be frightened by a single high reading, but if you get several high readings, check in with your healthcare provider.   · Note: When blood pressure reaches a systolic (top number) of 419 or higher or a diastolic (bottom number) of 110 or hig pressure:  · Normal blood pressure is systolic of less than 696 and diastolic of less than 80 (120/80)  · Elevated blood pressure is systolic of 664 to 230 and diastolic less than 80  · Stage 1 high blood pressure is systolic is 592 to 206 or diastolic bet 4 days a week. Manage stress  · Make time to relax and enjoy life. Find time to laugh. · Communicate your concerns with your loved ones and your healthcare provider. · Visit with family and friends, and keep up with hobbies.     Limit alcohol and quit ketchup  · Canned vegetables, soups, and fish not labeled as no-salt-added or reduced sodium  · Packaged gravies and sauces  · Olives, pickles, and relish  · Bottled salad dressings    For snacks and desserts  · Yogurt  · Unsalted, air-popped popcorn  · Un blistering, peeling or loosening of the skin, including inside the mouth  · trouble passing urine or change in the amount of urine  · unusually weak or tired  · yellowing of the eyes or skin  Side effects that usually do not require medical attention (repo may tell you to stop taking this medicine if you develop muscle problems. If your muscle problems do not go away after stopping this medicine, contact your health care professional.  Do not become pregnant while taking this medicine.  Women should inform th water. Follow the directions on the prescription label. Take this medicine with food. Do not cut, crush or chew this medicine. Take your medicine at regular intervals. Do not take it more often than directed.  Do not stop taking except on your doctor's advi breast-feeding  What should I watch for while using this medicine? You may need blood work done while you are taking this medicine. Follow a good diet and exercise plan. Taking this medicine does not replace a healthy lifestyle.  Some foods that have omeg weakness)  · kidney injury (trouble passing urine or change in the amount of urine)  · low blood pressure (dizziness; feeling faint or lightheaded, falls; unusually weak or tired)  Side effects that usually do not require medical attention (report to your second or third trimester. Talk to your health care provider for more information. You may get dizzy. Do not drive, use machinery, or do anything that needs mental alertness until you know how this drug affects you.  Do not stand or sit up quickly, especia

## 2021-03-23 ENCOUNTER — TELEPHONE (OUTPATIENT)
Dept: INTERNAL MEDICINE CLINIC | Facility: CLINIC | Age: 41
End: 2021-03-23

## 2021-03-23 NOTE — TELEPHONE ENCOUNTER
Prior authorization for icosapent ethyl has been approved from 3/23/21 through 2/23/22. Up to 4 capsules a day. Pharmacy has been notified.

## 2021-03-23 NOTE — TELEPHONE ENCOUNTER
Prior authorization request received through CoverMyMeds    Prior authorization for Icosapent Ethyl 1GM completed w/ [employer] on cover my meds Key: [id number], turn around time 3-5 days.

## 2021-04-26 ENCOUNTER — TELEMEDICINE (OUTPATIENT)
Dept: INTERNAL MEDICINE CLINIC | Facility: CLINIC | Age: 41
End: 2021-04-26

## 2021-04-26 VITALS — SYSTOLIC BLOOD PRESSURE: 130 MMHG | DIASTOLIC BLOOD PRESSURE: 80 MMHG

## 2021-04-26 DIAGNOSIS — E78.2 MIXED HYPERLIPIDEMIA: Primary | ICD-10-CM

## 2021-04-26 DIAGNOSIS — I10 HYPERTENSION GOAL BP (BLOOD PRESSURE) < 130/80: ICD-10-CM

## 2021-04-26 PROCEDURE — 3075F SYST BP GE 130 - 139MM HG: CPT | Performed by: INTERNAL MEDICINE

## 2021-04-26 PROCEDURE — 99214 OFFICE O/P EST MOD 30 MIN: CPT | Performed by: INTERNAL MEDICINE

## 2021-04-26 PROCEDURE — 3079F DIAST BP 80-89 MM HG: CPT | Performed by: INTERNAL MEDICINE

## 2021-04-26 RX ORDER — TELMISARTAN 80 MG/1
80 TABLET ORAL NIGHTLY
Qty: 90 TABLET | Refills: 1 | Status: SHIPPED | OUTPATIENT
Start: 2021-04-26 | End: 2021-05-28

## 2021-04-26 NOTE — PROGRESS NOTES
Telehealth Visit        I spoke with Da Merrill by secure video chat ([email or web address]), verified date of birth, and discussed their current concerns:     Reason for Visit:  1.   His hypertension that is uncontrolled but improving, he is toleratin Mood and Affect: Mood normal.         Thought Content:  Thought content normal.         Reviewed current medications:  Current Outpatient Medications   Medication Sig Dispense Refill   • telmisartan 80 MG Oral Tab Take 1 tablet (80 mg total) by mouth nightl 03/12/2021    ALB 4.1 03/12/2021    GLOBULIN 3.3 03/12/2021     03/12/2021    K 4.4 03/12/2021     03/12/2021    CO2 30.0 03/12/2021     Lab Results   Component Value Date     03/12/2021    A1C 5.9 (H) 03/12/2021     Lab Results   Compon good evelia to provide continuity of care in the best interest of the provider-patient relationship, due to the COVID -19 public health crisis/national emergency where restrictions of face-to-face office visits are ongoing.  Every conscious effort was taken

## 2021-04-27 ENCOUNTER — OFFICE VISIT (OUTPATIENT)
Dept: NEUROLOGY | Facility: CLINIC | Age: 41
End: 2021-04-27
Payer: COMMERCIAL

## 2021-04-27 VITALS — BODY MASS INDEX: 33.37 KG/M2 | WEIGHT: 260 LBS | HEIGHT: 74 IN

## 2021-04-27 DIAGNOSIS — R20.0 BILATERAL HAND NUMBNESS: Primary | ICD-10-CM

## 2021-04-27 PROCEDURE — 99214 OFFICE O/P EST MOD 30 MIN: CPT | Performed by: PHYSICAL MEDICINE & REHABILITATION

## 2021-04-27 PROCEDURE — 3008F BODY MASS INDEX DOCD: CPT | Performed by: PHYSICAL MEDICINE & REHABILITATION

## 2021-04-27 RX ORDER — MELOXICAM 15 MG/1
TABLET ORAL
Qty: 15 TABLET | Refills: 0 | Status: SHIPPED | OUTPATIENT
Start: 2021-04-27 | End: 2021-07-07

## 2021-04-27 NOTE — PROGRESS NOTES
Progress note    C/C: hand and forearm pain, numbness    HPI: 61-year-old male presents for follow-up with a new issue to me.   He has been having at least 2 years of numbness involving all 5 fingers of both hands, that is most prominently felt in the fourt if he can tolerate it. I sent him a message with a link to the [employer] patient information page on cubital tunnel syndrome for him to review, which also has an explanation of how to properly apply a towel roll to ease symptoms of cubital tunnel syndrome.   Olman Terry

## 2021-05-19 ENCOUNTER — TELEPHONE (OUTPATIENT)
Dept: NEUROLOGY | Facility: CLINIC | Age: 41
End: 2021-05-19

## 2021-05-19 ENCOUNTER — PROCEDURE VISIT (OUTPATIENT)
Dept: NEUROLOGY | Facility: CLINIC | Age: 41
End: 2021-05-19
Payer: COMMERCIAL

## 2021-05-19 DIAGNOSIS — R20.0 BILATERAL HAND NUMBNESS: Primary | ICD-10-CM

## 2021-05-19 PROCEDURE — 95886 MUSC TEST DONE W/N TEST COMP: CPT | Performed by: PHYSICAL MEDICINE & REHABILITATION

## 2021-05-19 PROCEDURE — 95910 NRV CNDJ TEST 7-8 STUDIES: CPT | Performed by: PHYSICAL MEDICINE & REHABILITATION

## 2021-05-19 NOTE — PROCEDURES
[address] [place] [address]  Phone: [phone]  Fax: [phone]    ELECTRODIAGNOSTIC REPORT          Patient: Nhi Covington Hand Dominance:  Right   Patient ID: [id number] Referring Dr:  Dr. Yoni Wylie paraspinals, though he did have difficulty with complete relaxation and there were also normal waveforms seen. Conclusion:   1) This is an abnormal electrodiagnostic study.   2) The electrodiagnostic findings are suggestive of a left C7 radiculopathy, Median, Ulnar - Transcarpal comparison      Median Palm Wrist 1.30 1.72 104.3 134.0 Median Palm - Wrist 8  61      Ulnar Palm Wrist 1.35 1.77 17.7 40.0 Ulnar Palm - Wrist 8  59   L Median, Ulnar - Transcarpal comparison      Median Palm Wrist 1.35 1.77 129

## 2021-05-19 NOTE — TELEPHONE ENCOUNTER
Contacted [employer] online to initiate authorization for C-Spine MRI CPT [id number] dx:R20.0 to be done at [address] [place] [address].   Coverage is active    Status: Approved with order #[id number] valid 5/19/21-6/17/21    All approved cpt codes are 74440, 60952, [address] [place] [address], *[id number], *19468    Pa

## 2021-05-19 NOTE — PROGRESS NOTES
Findings were discussed at the conclusion of the visit. Questionable left cervical radiculopathy, though this would not explain the symptoms in the right hand and forearm, and even then the symptoms only partially correlate to the left arm/hand symptoms.

## 2021-05-28 ENCOUNTER — TELEMEDICINE (OUTPATIENT)
Dept: INTERNAL MEDICINE CLINIC | Facility: CLINIC | Age: 41
End: 2021-05-28
Payer: COMMERCIAL

## 2021-05-28 VITALS — DIASTOLIC BLOOD PRESSURE: 82 MMHG | SYSTOLIC BLOOD PRESSURE: 119 MMHG

## 2021-05-28 DIAGNOSIS — Z82.49 FAMILY HISTORY OF EARLY CAD: ICD-10-CM

## 2021-05-28 DIAGNOSIS — E78.2 MIXED HYPERLIPIDEMIA: ICD-10-CM

## 2021-05-28 DIAGNOSIS — M54.12 C7 RADICULOPATHY: ICD-10-CM

## 2021-05-28 DIAGNOSIS — I10 HYPERTENSION GOAL BP (BLOOD PRESSURE) < 130/80: Primary | ICD-10-CM

## 2021-05-28 DIAGNOSIS — R73.03 PREDIABETES: ICD-10-CM

## 2021-05-28 DIAGNOSIS — E78.1 HYPERTRIGLYCERIDEMIA: ICD-10-CM

## 2021-05-28 PROCEDURE — 3079F DIAST BP 80-89 MM HG: CPT | Performed by: INTERNAL MEDICINE

## 2021-05-28 PROCEDURE — 99214 OFFICE O/P EST MOD 30 MIN: CPT | Performed by: INTERNAL MEDICINE

## 2021-05-28 PROCEDURE — 3074F SYST BP LT 130 MM HG: CPT | Performed by: INTERNAL MEDICINE

## 2021-05-28 RX ORDER — ROSUVASTATIN CALCIUM 10 MG/1
10 TABLET, COATED ORAL NIGHTLY
Qty: 90 TABLET | Refills: 1 | Status: SHIPPED | OUTPATIENT
Start: 2021-05-28 | End: 2022-01-17

## 2021-05-28 RX ORDER — ICOSAPENT ETHYL 1000 MG/1
2 CAPSULE ORAL 2 TIMES DAILY
Qty: 360 CAPSULE | Refills: 1 | Status: SHIPPED | OUTPATIENT
Start: 2021-05-28 | End: 2021-08-26

## 2021-05-28 RX ORDER — TELMISARTAN 80 MG/1
80 TABLET ORAL NIGHTLY
Qty: 90 TABLET | Refills: 1 | Status: SHIPPED | OUTPATIENT
Start: 2021-05-28 | End: 2022-01-17

## 2021-05-28 NOTE — PROGRESS NOTES
Telehealth Visit        I spoke with Anna Chapa by secure video chat ([email or web address]), verified date of birth, and discussed their current concerns:     Reason for Visit:    HPI   1.   His hypertension is very well controlled the ambulatory sett Cervical back: Normal range of motion and neck supple. Skin:     Coloration: Skin is not jaundiced. Neurological:      General: No focal deficit present. Mental Status: He is alert and oriented to person, place, and time.  Mental status is at bas Calcium 10 MG Oral Tab; Take 1 tablet (10 mg total) by mouth nightly. FOR HIGH CHOLESTEROL. Dispense: 90 tablet; Refill: 1  4. Hypertriglyceridemia  - Icosapent Ethyl (VASCEPA) 1 g Oral Cap; Take 2 g by mouth 2 (two) times a day. FOR TRIGLYCERIDES.   Dispe 03/12/2021     03/12/2021    K 4.4 03/12/2021     03/12/2021    CO2 30.0 03/12/2021     Lab Results   Component Value Date     03/12/2021    A1C 5.9 (H) 03/12/2021     Lab Results   Component Value Date    WBC 8.7 03/12/2021    RBC 5.12 related to the telehealth platform. The patient was made aware of where to find [facility] notice of privacy practices, telehealth consent form and other related consent forms and documents. which are located on the [facility] website.  The patient verbally agreed to

## 2021-06-10 ENCOUNTER — MED REC SCAN ONLY (OUTPATIENT)
Dept: NEUROLOGY | Facility: CLINIC | Age: 41
End: 2021-06-10

## 2021-06-11 ENCOUNTER — LAB ENCOUNTER (OUTPATIENT)
Dept: LAB | Facility: HOSPITAL | Age: 41
End: 2021-06-11
Attending: PHYSICAL MEDICINE & REHABILITATION
Payer: COMMERCIAL

## 2021-06-11 ENCOUNTER — HOSPITAL ENCOUNTER (OUTPATIENT)
Dept: MRI IMAGING | Facility: HOSPITAL | Age: 41
Discharge: HOME | End: 2021-06-11
Attending: PHYSICAL MEDICINE & REHABILITATION
Payer: COMMERCIAL

## 2021-06-11 DIAGNOSIS — E78.1 HYPERTRIGLYCERIDEMIA: ICD-10-CM

## 2021-06-11 DIAGNOSIS — I10 HYPERTENSION GOAL BP (BLOOD PRESSURE) < 130/80: ICD-10-CM

## 2021-06-11 DIAGNOSIS — R20.0 BILATERAL HAND NUMBNESS: ICD-10-CM

## 2021-06-11 DIAGNOSIS — E78.2 MIXED HYPERLIPIDEMIA: ICD-10-CM

## 2021-06-11 DIAGNOSIS — R73.03 PREDIABETES: ICD-10-CM

## 2021-06-11 PROCEDURE — 83036 HEMOGLOBIN GLYCOSYLATED A1C: CPT

## 2021-06-11 PROCEDURE — 36415 COLL VENOUS BLD VENIPUNCTURE: CPT

## 2021-06-11 PROCEDURE — 72141 MRI NECK SPINE W/O DYE: CPT | Performed by: PHYSICAL MEDICINE & REHABILITATION

## 2021-06-11 PROCEDURE — 80053 COMPREHEN METABOLIC PANEL: CPT

## 2021-06-11 PROCEDURE — 80061 LIPID PANEL: CPT

## 2021-06-12 ENCOUNTER — PATIENT MESSAGE (OUTPATIENT)
Dept: NEUROLOGY | Facility: CLINIC | Age: 41
End: 2021-06-12

## 2021-06-14 ENCOUNTER — TELEPHONE (OUTPATIENT)
Dept: NEUROLOGY | Facility: CLINIC | Age: 41
End: 2021-06-14

## 2021-06-14 NOTE — TELEPHONE ENCOUNTER
Spoke to patient in regards to MRI results. Patient  verbalized understanding. Patient has made a F/U on 6/28.

## 2021-06-14 NOTE — TELEPHONE ENCOUNTER
----- Message from Whit Heredia DO sent at 6/14/2021  7:44 AM CDT -----  MRI reviewed; left sided disc herniation at one of the mid-levels of the neck, may be the cause of his symptoms. Follow up to discuss treatment options.

## 2021-06-28 ENCOUNTER — OFFICE VISIT (OUTPATIENT)
Dept: NEUROLOGY | Facility: CLINIC | Age: 41
End: 2021-06-28
Payer: COMMERCIAL

## 2021-06-28 ENCOUNTER — TELEPHONE (OUTPATIENT)
Dept: NEUROLOGY | Facility: CLINIC | Age: 41
End: 2021-06-28

## 2021-06-28 VITALS
BODY MASS INDEX: 34.14 KG/M2 | HEIGHT: 74 IN | SYSTOLIC BLOOD PRESSURE: 116 MMHG | WEIGHT: 266 LBS | DIASTOLIC BLOOD PRESSURE: 90 MMHG | OXYGEN SATURATION: 98 % | HEART RATE: 101 BPM

## 2021-06-28 DIAGNOSIS — M54.12 LEFT CERVICAL RADICULOPATHY: Primary | ICD-10-CM

## 2021-06-28 PROCEDURE — 3074F SYST BP LT 130 MM HG: CPT | Performed by: PHYSICAL MEDICINE & REHABILITATION

## 2021-06-28 PROCEDURE — 3080F DIAST BP >= 90 MM HG: CPT | Performed by: PHYSICAL MEDICINE & REHABILITATION

## 2021-06-28 PROCEDURE — 99215 OFFICE O/P EST HI 40 MIN: CPT | Performed by: PHYSICAL MEDICINE & REHABILITATION

## 2021-06-28 PROCEDURE — 3008F BODY MASS INDEX DOCD: CPT | Performed by: PHYSICAL MEDICINE & REHABILITATION

## 2021-06-28 NOTE — TELEPHONE ENCOUNTER
Patient has been scheduled for a C7-T1 interlaminar epidural steroid injection under fluoroscopy  on 7/7/21 at the [facility]. Medications and allergies reviewed.  Patient informed we will need verbal or written authorization from patients Primary Care Physician/

## 2021-06-28 NOTE — PROGRESS NOTES
Progress note    C/C: pain in both hands, episodic left low back and leg pain    HPI: 68-year-old male presents for follow-up after having had MRI of the cervical spine.   He continues to have pain, numbness, and tingling in both hands, left worse than righ raise negative bilaterally    Imaging: MRI of the cervical spine was independently reviewed with patient, as was report, dated 6/11/2021.   Small diffuse disc bulges, most pertinent finding was a small left paracentral protrusion at C5-C6 without loss of no

## 2021-06-28 NOTE — TELEPHONE ENCOUNTER
[employer] for authorization of approval for C7-T1 interlaminar epidural steroid injection under fluoroscopy cpt copde 62750. Authorization # [id number] for one unit/DOS effective 06/28/21 to 07/12/21. Will inform Nursing.

## 2021-07-07 ENCOUNTER — OFFICE VISIT (OUTPATIENT)
Dept: SURGERY | Facility: CLINIC | Age: 41
End: 2021-07-07

## 2021-07-07 DIAGNOSIS — M54.12 C7 RADICULOPATHY: Primary | ICD-10-CM

## 2021-07-07 PROCEDURE — 99152 MOD SED SAME PHYS/QHP 5/>YRS: CPT | Performed by: PHYSICAL MEDICINE & REHABILITATION

## 2021-07-07 PROCEDURE — 62321 NJX INTERLAMINAR CRV/THRC: CPT | Performed by: PHYSICAL MEDICINE & REHABILITATION

## 2021-07-07 NOTE — PROCEDURES
Andrew LINDA 7.    CERVICAL INTERLAMINAR  NAME:  Inda Lanes    MR #:    [id number] :  1980     PHYSICIAN:  Yanci Castaneda        Operative Report    DATE OF PROCEDURE: 2021   PREOPERATIVE DIAGNOSES: 1.  C7 radiculopathy monitored and they remained completely stable. Also, throughout the whole procedure, prior to injection of any medication, aspiration was performed. No blood, fluid, or air was aspirated at anytime.     Please note that IV conscious sedation was utilized

## 2021-07-22 ENCOUNTER — OFFICE VISIT (OUTPATIENT)
Dept: ENDOCRINOLOGY CLINIC | Facility: CLINIC | Age: 41
End: 2021-07-22
Payer: COMMERCIAL

## 2021-07-22 VITALS
BODY MASS INDEX: 34 KG/M2 | HEART RATE: 85 BPM | DIASTOLIC BLOOD PRESSURE: 76 MMHG | SYSTOLIC BLOOD PRESSURE: 110 MMHG | WEIGHT: 265 LBS

## 2021-07-22 DIAGNOSIS — R73.03 PREDIABETES: Primary | ICD-10-CM

## 2021-07-22 DIAGNOSIS — E66.9 CLASS 1 OBESITY WITH BODY MASS INDEX (BMI) OF 34.0 TO 34.9 IN ADULT, UNSPECIFIED OBESITY TYPE, UNSPECIFIED WHETHER SERIOUS COMORBIDITY PRESENT: ICD-10-CM

## 2021-07-22 LAB
GLUCOSE BLOOD: 101
TEST STRIP LOT #: NORMAL NUMERIC

## 2021-07-22 PROCEDURE — 82947 ASSAY GLUCOSE BLOOD QUANT: CPT | Performed by: NURSE PRACTITIONER

## 2021-07-22 PROCEDURE — 3078F DIAST BP <80 MM HG: CPT | Performed by: NURSE PRACTITIONER

## 2021-07-22 PROCEDURE — 3074F SYST BP LT 130 MM HG: CPT | Performed by: NURSE PRACTITIONER

## 2021-07-22 PROCEDURE — 36416 COLLJ CAPILLARY BLOOD SPEC: CPT | Performed by: NURSE PRACTITIONER

## 2021-07-22 PROCEDURE — 99203 OFFICE O/P NEW LOW 30 MIN: CPT | Performed by: NURSE PRACTITIONER

## 2021-07-22 RX ORDER — SEMAGLUTIDE 2.4 MG/.75ML
2.4 INJECTION, SOLUTION SUBCUTANEOUS WEEKLY
Qty: 9 ML | Refills: 0 | Status: SHIPPED | OUTPATIENT
Start: 2021-07-22

## 2021-07-22 RX ORDER — SEMAGLUTIDE 1.7 MG/.75ML
1.7 INJECTION, SOLUTION SUBCUTANEOUS WEEKLY
Qty: 3 ML | Refills: 0 | Status: SHIPPED | OUTPATIENT
Start: 2021-07-22

## 2021-07-22 RX ORDER — SEMAGLUTIDE 0.25 MG/.5ML
0.25 INJECTION, SOLUTION SUBCUTANEOUS WEEKLY
Qty: 2 ML | Refills: 0 | Status: SHIPPED | OUTPATIENT
Start: 2021-07-22

## 2021-07-22 RX ORDER — SEMAGLUTIDE 0.5 MG/.5ML
0.5 INJECTION, SOLUTION SUBCUTANEOUS WEEKLY
Qty: 2 ML | Refills: 0 | Status: SHIPPED | OUTPATIENT
Start: 2021-07-22

## 2021-07-22 RX ORDER — SEMAGLUTIDE 1 MG/.5ML
1 INJECTION, SOLUTION SUBCUTANEOUS WEEKLY
Qty: 2 ML | Refills: 0 | Status: SHIPPED | OUTPATIENT
Start: 2021-07-22

## 2021-07-22 NOTE — PATIENT INSTRUCTIONS
A1C: 5.9% on 6/11/2021  Blood glucose: 101 in clinic today    Medications:   -Start Wegovy 0.25mg once weekly for 4 weeks   -> 0.5mg once weekly for 4 weeks   -> 1mg once weekly for 4 weeks   -> 1.7mg once weekly for 4 weeks    -> 2.4mg once weekly    Comm

## 2021-07-22 NOTE — PROGRESS NOTES
Name: Tara Garcia  Date: 7/22/2021    Referring Physician: No ref.  provider found    CHIEF COMPLAINT   Pre-diabetes   Obesity with BMI 34.02    HISTORY OF PRESENT ILLNESS   Tara Garcia is a 46year old male who presents for new consulation on p Negative for:  chest pain, chest discomfort, palpitations  GI: Negative for:  abdominal pain, nausea, vomiting, diarrhea, heartburn, constipation  Neurology: Negative for: headache, dizziness, syncope, numbness/tingling, or weakness.    Genito-Urinary: Fleurette Fore Past Surgical History:   Procedure Laterality Date   • OTHER SURGICAL HISTORY      right eye removed due melanoma       PHYSICAL EXAM:    07/22/21  1102   BP: 110/76   Pulse: 85   Weight: 265 lb (120.2 kg)     BMI:   Body mass index is 34.02 kg/m².     Ge rosuvastatin 10mg at bedtime and vascepa 2g twice daily     4.  Severe Obesity Class 1- BMI 34.02  -reviewed low carbohydrate diet and discussed to limit carbs to 45gm per meal.   -discussed to avoid snacks between meals   -discussed to increase physical ac

## 2021-11-15 ENCOUNTER — NURSE ONLY (OUTPATIENT)
Dept: INFUSION CENTER | Age: 41
End: 2021-11-15
Attending: INTERNAL MEDICINE
Payer: COMMERCIAL

## 2021-11-15 ENCOUNTER — TELEMEDICINE (OUTPATIENT)
Dept: INTERNAL MEDICINE CLINIC | Facility: CLINIC | Age: 41
End: 2021-11-15
Payer: COMMERCIAL

## 2021-11-15 VITALS
BODY MASS INDEX: 34 KG/M2 | SYSTOLIC BLOOD PRESSURE: 118 MMHG | TEMPERATURE: 99 F | OXYGEN SATURATION: 99 % | WEIGHT: 265 LBS | HEART RATE: 91 BPM | RESPIRATION RATE: 20 BRPM | DIASTOLIC BLOOD PRESSURE: 78 MMHG

## 2021-11-15 DIAGNOSIS — R73.03 PREDIABETES: ICD-10-CM

## 2021-11-15 DIAGNOSIS — I10 HYPERTENSION GOAL BP (BLOOD PRESSURE) < 130/80: ICD-10-CM

## 2021-11-15 DIAGNOSIS — U07.1 RESPIRATORY TRACT INFECTION DUE TO COVID-19 VIRUS: Primary | ICD-10-CM

## 2021-11-15 DIAGNOSIS — J98.8 RESPIRATORY TRACT INFECTION DUE TO COVID-19 VIRUS: Primary | ICD-10-CM

## 2021-11-15 DIAGNOSIS — E78.2 MIXED HYPERLIPIDEMIA: ICD-10-CM

## 2021-11-15 DIAGNOSIS — E66.9 OBESITY (BMI 30-39.9): ICD-10-CM

## 2021-11-15 PROCEDURE — 99213 OFFICE O/P EST LOW 20 MIN: CPT | Performed by: INTERNAL MEDICINE

## 2021-11-15 NOTE — PROGRESS NOTES
Telehealth Visit      I spoke with Wilma Galeana by secure Epic/[employer] video service on 11/15/21, verified date of birth, patient stated [place] the [place], and discussed their concerns as below:     Upper Respiratory Infection   This Left Ear: External ear normal.      Nose: Nose normal.   Eyes:      Conjunctiva/sclera: Conjunctivae normal.   Pulmonary:      Effort: Pulmonary effort is normal. No respiratory distress.    Musculoskeletal:      Cervical back: Normal range of motion and n Obesity (BMI 30-39.9)  -     [facility]  3. Prediabetes  -     [facility]  4. Hypertension goal BP (blood pressure) < 130/80  -     [facility]  5.  Mixed hyperlipidemia  -     [address] dictation software. As a result errors may occur. When identified these errors have been corrected.  While every attempt is made to correct errors during dictation discrepancies may still exist.    Telehealth Verbal Consent   I conducted a telehealth visit

## 2021-11-16 ENCOUNTER — TELEPHONE (OUTPATIENT)
Dept: INTERNAL MEDICINE CLINIC | Facility: CLINIC | Age: 41
End: 2021-11-16

## 2021-11-16 NOTE — TELEPHONE ENCOUNTER
Pt received PAB infusion at [facility] on 11/15 for COVID-19. Please follow-up with pt for post-infusion assessment and home monitoring if needed. Thank you.

## 2021-11-16 NOTE — TELEPHONE ENCOUNTER
Home Monitoring Day 1 post PAB infusion, no home monitoring order. What  was your temp today? 97    How did you take your temp?     oral    What was your pulse ox today?  N/a    Are you feeling short of breath today? no    Is the shortness of breath bet

## 2021-11-16 NOTE — PROGRESS NOTES
1743-Patient arrived for his infusion. Procedure gone over with patient. Patient had no questions. 1752-22G angiocatheter inserted into patients left wrist without difficulty. 1800-Infusion started. Patient resting without distress.   1810-Patient de

## 2021-11-20 ENCOUNTER — TELEPHONE (OUTPATIENT)
Dept: ENDOCRINOLOGY CLINIC | Facility: CLINIC | Age: 41
End: 2021-11-20

## 2021-11-20 RX ORDER — ONDANSETRON 4 MG/1
4 TABLET, FILM COATED ORAL EVERY 8 HOURS PRN
Qty: 20 TABLET | Refills: 0 | Status: SHIPPED | OUTPATIENT
Start: 2021-11-20

## 2021-12-10 ENCOUNTER — NURSE TRIAGE (OUTPATIENT)
Dept: INTERNAL MEDICINE CLINIC | Facility: CLINIC | Age: 41
End: 2021-12-10

## 2021-12-10 NOTE — TELEPHONE ENCOUNTER
Action Requested: Summary for Provider     []  Critical Lab, Recommendations Needed  [] Need Additional Advice  []   FYI    []   Need Orders  [] Need Medications Sent to Pharmacy  []  Other     SUMMARY: Per protocol: OV within 3 days.  Patient made MyChart

## 2021-12-14 PROBLEM — F32.A ANXIETY AND DEPRESSION: Status: ACTIVE | Noted: 2021-12-14

## 2021-12-14 PROBLEM — F41.9 ANXIETY AND DEPRESSION: Status: ACTIVE | Noted: 2021-12-14

## 2021-12-14 NOTE — PROGRESS NOTES
History of Present Illness   Patient ID: Hermila Velez is a 46year old male. Today's Date: 12/14/21  Chief Complaint: Depression      HPI   Pleasant 72-year-old gentleman who presents to discuss depression anxiety.   He has a history of both depress Skin is not jaundiced. Neurological:      General: No focal deficit present. Mental Status: He is alert and oriented to person, place, and time. Mental status is at baseline. Psychiatric:         Mood and Affect: Mood is depressed.          [employer] total) daily. Dispense: 87 tablet;  Refill: 0  -     [address]  Discussed with patient that Covid infection can precipitate depression and anxiety but he does have a history over 10 years ago and he states he was feeling this way before Covid, the exist.    ----------------------------------------- PATIENT INSTRUCTIONS-----------------------------------------     There are no Patient Instructions on file for this visit.

## 2021-12-17 ENCOUNTER — NURSE TRIAGE (OUTPATIENT)
Dept: INTERNAL MEDICINE CLINIC | Facility: CLINIC | Age: 41
End: 2021-12-17

## 2021-12-17 NOTE — ED PROVIDER NOTES
Patient Seen in: [facility] Emergency Department    History   Patient presents with: Anxiety/Panic attack    Stated Complaint: panic attack    HPI    45 yo M here with complains of anxiety  Unsure of trigger  Was at home and felt trapped.    [place] FOR HIGH CHOLESTEROL. Telmisartan 80 MG Oral Tab,  Take 1 tablet (80 mg total) by mouth nightly. FOR BLOOD PRESSURE   HYDROcodone-acetaminophen 7.5-325 MG Oral Tab,  Take 1 tablet by mouth every 8 (eight) hours as needed for Pain.        Family History up    DC home with sister.                          Disposition and Plan     Clinical Impression:  Anxiety  (primary encounter diagnosis)     Disposition:  Discharge  12/17/2021  1:59 pm    Follow-up:  YOUR PSYCHIATRIST                Medications Prescribed

## 2021-12-17 NOTE — ED QUICK NOTES
Pt states anxiety attack pta. States that he was talking to somone and then felt as if he was going to pass out and got very anxious. Was started on anxiety meds 2 days ago. Pt reports that he has had similar episodes in the past. No reported SI or HI.  Saf

## 2021-12-17 NOTE — TELEPHONE ENCOUNTER
Please reply to pool: EM RN TRIAGE  Action Requested: Summary for Provider     []  Critical Lab, Recommendations Needed  [] Need Additional Advice  [x]   FYI    []   Need Orders  [] Need Medications Sent to Pharmacy  []  Other     SUMMARY: Received call

## 2021-12-18 NOTE — TELEPHONE ENCOUNTER
Noted, thanks. Has behavioral health referral, please have sushila garcia assist with establishing care with psychiatry MD asap, appreciated.

## 2021-12-18 NOTE — TELEPHONE ENCOUNTER
Patient was seen at the [facility] on 12/17   Was treated and released     Disposition and Plan      Clinical Impression:  Anxiety  (primary encounter diagnosis)      Disposition:  Discharge  12/17/2021  1:59 pm     Follow-up:  YOUR PSYCHIATRIST

## 2022-01-17 ENCOUNTER — TELEMEDICINE (OUTPATIENT)
Dept: INTERNAL MEDICINE CLINIC | Facility: CLINIC | Age: 42
End: 2022-01-17
Payer: COMMERCIAL

## 2022-01-17 DIAGNOSIS — I10 HYPERTENSION GOAL BP (BLOOD PRESSURE) < 130/80: ICD-10-CM

## 2022-01-17 DIAGNOSIS — Z82.49 FAMILY HISTORY OF EARLY CAD: ICD-10-CM

## 2022-01-17 DIAGNOSIS — E78.2 MIXED HYPERLIPIDEMIA: ICD-10-CM

## 2022-01-17 DIAGNOSIS — F41.9 ANXIETY AND DEPRESSION: ICD-10-CM

## 2022-01-17 DIAGNOSIS — F32.A ANXIETY AND DEPRESSION: ICD-10-CM

## 2022-01-17 PROCEDURE — 99214 OFFICE O/P EST MOD 30 MIN: CPT | Performed by: INTERNAL MEDICINE

## 2022-01-17 RX ORDER — ESCITALOPRAM OXALATE 10 MG/1
10 TABLET ORAL DAILY
Qty: 90 TABLET | Refills: 3 | Status: SHIPPED | OUTPATIENT
Start: 2022-01-17

## 2022-01-17 RX ORDER — ROSUVASTATIN CALCIUM 10 MG/1
10 TABLET, COATED ORAL NIGHTLY
Qty: 90 TABLET | Refills: 3 | Status: SHIPPED | OUTPATIENT
Start: 2022-01-17

## 2022-01-17 RX ORDER — HYDROXYZINE PAMOATE 50 MG/1
50 CAPSULE ORAL 3 TIMES DAILY PRN
Qty: 90 CAPSULE | Refills: 0 | Status: SHIPPED | OUTPATIENT
Start: 2022-01-17

## 2022-01-17 RX ORDER — TELMISARTAN 80 MG/1
80 TABLET ORAL NIGHTLY
Qty: 90 TABLET | Refills: 3 | Status: SHIPPED | OUTPATIENT
Start: 2022-01-17

## 2022-01-17 NOTE — PROGRESS NOTES
Telehealth Visit      I spoke with Chelsie Paige by secure Epic/[employer]-Obvious video service on 01/17/22, verified date of birth, patient stated [place] the [place], and discussed their concerns as below:     HPI   1.   He has anxiety and depres reviewed. Constitutional:       General: He is not in acute distress. Appearance: Normal appearance. HENT:      Head: Normocephalic and atraumatic.       Right Ear: External ear normal.      Left Ear: External ear normal.      Nose: Nose normal.   E semaglutide-weight management (WEGOVY) 1.7 MG/0.75ML Subcutaneous Solution Auto-injector Inject 0.75 mL (1.7 mg total) into the skin once a week.  3 mL 0   • semaglutide-weight management (WEGOVY) 2.4 MG/0.75ML Subcutaneous Solution Auto-injector Inject 0.7 current medications, denies adverse effects, continue with present management. Follow Up: As needed/if symptoms worsen or Return for ANNUAL EXAM in march 2022. .         Labs/imaging, medical/social history  Pertinent labs and imaging reviewed in Ep complete the visit. The patient was made aware of the limitations of the telehealth visit, including treatment limitations as no physical exam could be performed. The patient was advised to call 911 or to go to the ER in case there was an emergency.   The max

## 2022-01-18 ENCOUNTER — OFFICE VISIT (OUTPATIENT)
Dept: PHYSICAL MEDICINE AND REHAB | Facility: CLINIC | Age: 42
End: 2022-01-18
Payer: COMMERCIAL

## 2022-01-18 ENCOUNTER — TELEPHONE (OUTPATIENT)
Dept: NEUROLOGY | Facility: CLINIC | Age: 42
End: 2022-01-18

## 2022-01-18 VITALS
WEIGHT: 250 LBS | HEART RATE: 103 BPM | BODY MASS INDEX: 32.08 KG/M2 | SYSTOLIC BLOOD PRESSURE: 130 MMHG | HEIGHT: 74 IN | OXYGEN SATURATION: 98 % | DIASTOLIC BLOOD PRESSURE: 70 MMHG

## 2022-01-18 DIAGNOSIS — G47.33 OSA ON CPAP: Primary | ICD-10-CM

## 2022-01-18 DIAGNOSIS — Z99.89 OSA ON CPAP: Primary | ICD-10-CM

## 2022-01-18 DIAGNOSIS — M54.12 C7 RADICULOPATHY: ICD-10-CM

## 2022-01-18 PROCEDURE — 3078F DIAST BP <80 MM HG: CPT | Performed by: PHYSICAL MEDICINE & REHABILITATION

## 2022-01-18 PROCEDURE — 99214 OFFICE O/P EST MOD 30 MIN: CPT | Performed by: PHYSICAL MEDICINE & REHABILITATION

## 2022-01-18 PROCEDURE — 3075F SYST BP GE 130 - 139MM HG: CPT | Performed by: PHYSICAL MEDICINE & REHABILITATION

## 2022-01-18 PROCEDURE — 3008F BODY MASS INDEX DOCD: CPT | Performed by: PHYSICAL MEDICINE & REHABILITATION

## 2022-01-18 NOTE — TELEPHONE ENCOUNTER
Patient has been scheduled for a C7-T1 interlaminar epidural steroid injection under fluoroscopy, IVCS on 2/7/22 at the [facility]. Medications and allergies reviewed.  Patient informed to hold aspirins, nsaids, blood thinners, multivitamins, phentermine, vitam

## 2022-01-18 NOTE — TELEPHONE ENCOUNTER
[employer] for authorization of approval for C7-T1 interlaminar epidural steroid injection under fluoroscopy cpt codes 30662, 50274. Authorization # [id number] valid 01/21/2022 - 02/19/2022. Will inform Nursing.

## 2022-01-18 NOTE — PROGRESS NOTES
Progress note    C/C: bilateral hand pain    HPI: 45 yo m presents for f/u. 80-90% relief of bilateral hand pain, numbness and tingling for about 2-3 months, and then symptoms slowly began to return.  He has soreness in both hands, one not worse than the ot

## 2022-01-18 NOTE — PATIENT INSTRUCTIONS
I do injections at [facility] on Monday mornings, and at the [facility] in [address] [place] [address] a month. If you would rather have this done at [facility] please let us know by either phone call or [employer] message.  Alternatively once we have the melodie

## 2022-01-25 ENCOUNTER — MED REC SCAN ONLY (OUTPATIENT)
Dept: PHYSICAL MEDICINE AND REHAB | Facility: CLINIC | Age: 42
End: 2022-01-25

## 2022-02-04 ENCOUNTER — LAB REQUISITION (OUTPATIENT)
Dept: SURGERY | Age: 42
End: 2022-02-04
Payer: COMMERCIAL

## 2022-02-05 LAB — SARS-COV-2 RNA RESP QL NAA+PROBE: NOT DETECTED

## 2022-02-07 ENCOUNTER — OFFICE VISIT (OUTPATIENT)
Dept: SURGERY | Facility: CLINIC | Age: 42
End: 2022-02-07

## 2022-02-07 DIAGNOSIS — M54.12 C7 RADICULOPATHY: Primary | ICD-10-CM

## 2022-02-07 PROCEDURE — 99152 MOD SED SAME PHYS/QHP 5/>YRS: CPT | Performed by: PHYSICAL MEDICINE & REHABILITATION

## 2022-02-07 PROCEDURE — 62321 NJX INTERLAMINAR CRV/THRC: CPT | Performed by: PHYSICAL MEDICINE & REHABILITATION

## 2022-02-07 NOTE — PROCEDURES
[facility] [address]    CERVICAL INTERLAMINAR  NAME:  Soraida Winchester    MR #:    [id number] :  1980     PHYSICIAN:  Franci Santiago DO        Operative Report    DATE OF PROCEDURE: 2022   PREOPERATIVE DIAGNOSES: 1. C7 radiculopathy        POSTOPERATIVE DIAGNOSES:   1. C7 radiculopathy        PROCEDURES: C7-T1 inter laminar epidural steroid injection done under fluoroscopic guidance with contrast enhancement. SURGEON: Franci Santiago DO   ANESTHESIA: IV conscious sedation   INDICATIONS: Radicular bilateral arm/hand pain     OPERATIVE PROCEDURE:  Written consent was obtained from the patient. The patient was brought into the operating room and placed in the prone position on the fluoroscopy table with pillow underneath the chest and shoulders. The patient's skin was cleaned and draped in a normal sterile fashion. Using AP fluoroscopy, the left C7 lamina was identified. Skin was anesthetized with 1% PF lidocaine without epinephrine. Then, a 3-1/2 inch, 20-gauge Tuohy needle was inserted and directed towards the left C7 lamina. When it was engaged, it was walked inferiorly and medially until it was felt to drop off the inferomedial aspect. Then, Omnipaque-240 contrast was used to obtain a good epidurogram indicating correct needle placement. Then, aspiration was performed. No blood, fluid, or air was aspirated. Then, the patient was injected with a 3 cc solution of 2 cc of 10 mg/cc of Dexamethasone and 1 cc of 0.9% normal saline. Then, the needle was removed. The patient's skin was cleaned. A Band-Aid was applied. The patient was transferred to the cart and into [facility]. The patient was given discharge instructions and will follow up in the clinic as scheduled. Throughout the whole procedure, the patient's pulse oximetry and vital signs were monitored and they remained completely stable.   Also, throughout the whole procedure, prior to injection of any medication, aspiration was performed. No blood, fluid, or air was aspirated at anytime. IV conscious sedation was utilized for this procedure. A total of 2mg of versed and 100mcg of fentanyl was administered over 13 minutes.     Lori Lay DO  Physical Medicine and [address]

## 2022-03-03 ENCOUNTER — OFFICE VISIT (OUTPATIENT)
Dept: PHYSICAL MEDICINE AND REHAB | Facility: CLINIC | Age: 42
End: 2022-03-03
Payer: COMMERCIAL

## 2022-03-03 VITALS
BODY MASS INDEX: 32.08 KG/M2 | HEIGHT: 74 IN | SYSTOLIC BLOOD PRESSURE: 114 MMHG | DIASTOLIC BLOOD PRESSURE: 62 MMHG | OXYGEN SATURATION: 97 % | WEIGHT: 250 LBS | HEART RATE: 96 BPM

## 2022-03-03 DIAGNOSIS — M54.12 C7 RADICULOPATHY: Primary | ICD-10-CM

## 2022-03-03 PROCEDURE — 3074F SYST BP LT 130 MM HG: CPT | Performed by: PHYSICAL MEDICINE & REHABILITATION

## 2022-03-03 PROCEDURE — 3078F DIAST BP <80 MM HG: CPT | Performed by: PHYSICAL MEDICINE & REHABILITATION

## 2022-03-03 PROCEDURE — 3008F BODY MASS INDEX DOCD: CPT | Performed by: PHYSICAL MEDICINE & REHABILITATION

## 2022-03-03 PROCEDURE — 99213 OFFICE O/P EST LOW 20 MIN: CPT | Performed by: PHYSICAL MEDICINE & REHABILITATION

## 2022-03-05 NOTE — PROGRESS NOTES
Progress note    C/C: Bilateral hand pain    HPI: 19-year-old male presents for follow-up. Underwent C7-T1 interlaminar epidural steroid injection under fluoroscopy, IV conscious sedation, on 2/7/2022.  60% relief of symptoms. He continues to experience bilateral hand pain, some diffuse, without associated numbness and tingling. Denies neck and arm pain. Hand pain is constant. Pertinent allergies: No known drug allergies    Physical exam:  BMI 32.10. Blood pressure 114/62. Pulse 96. O2 sat 97%    No neck rash  Cervical extension 50 degrees. Cervical flexion 50 degrees. Cervical rotation to the right 75 degrees. Cervical rotation to the left 75 degrees  Spurling's test negative bilaterally  5/5 strength in shoulder abduction, elbow flexion, elbow extension, wrist extension, finger flexion, FDI bilaterally  SILT b/l UE C5-T1 distributions  2/4 biceps, brachioradialis, triceps reflexes b/l  Reno test negative    Imaging: No new imaging to review    Assessment and plan  1. Bilateral cervical radiculopathy, improved    Improved, but with persistent symptoms. Recommend physical therapy for cervical stabilization. Follow-up upon completion of PT on an as-needed basis.     Ada Martínez DO  Physical Medicine and [address] [place] [address]

## 2023-03-21 ENCOUNTER — OFFICE VISIT (OUTPATIENT)
Dept: INTERNAL MEDICINE CLINIC | Facility: CLINIC | Age: 43
End: 2023-03-21

## 2023-03-21 ENCOUNTER — EKG ENCOUNTER (OUTPATIENT)
Dept: LAB | Age: 43
End: 2023-03-21
Attending: FAMILY MEDICINE
Payer: COMMERCIAL

## 2023-03-21 ENCOUNTER — LAB ENCOUNTER (OUTPATIENT)
Dept: LAB | Age: 43
End: 2023-03-21
Attending: FAMILY MEDICINE
Payer: COMMERCIAL

## 2023-03-21 VITALS
HEIGHT: 74 IN | SYSTOLIC BLOOD PRESSURE: 117 MMHG | DIASTOLIC BLOOD PRESSURE: 76 MMHG | BODY MASS INDEX: 35.42 KG/M2 | HEART RATE: 102 BPM | WEIGHT: 276 LBS

## 2023-03-21 DIAGNOSIS — F32.A ANXIETY AND DEPRESSION: ICD-10-CM

## 2023-03-21 DIAGNOSIS — Z00.00 ANNUAL PHYSICAL EXAM: Primary | ICD-10-CM

## 2023-03-21 DIAGNOSIS — Z12.5 ENCOUNTER FOR SCREENING FOR MALIGNANT NEOPLASM OF PROSTATE: ICD-10-CM

## 2023-03-21 DIAGNOSIS — Z00.00 ANNUAL PHYSICAL EXAM: ICD-10-CM

## 2023-03-21 DIAGNOSIS — I10 HYPERTENSION GOAL BP (BLOOD PRESSURE) < 130/80: ICD-10-CM

## 2023-03-21 DIAGNOSIS — F41.9 ANXIETY AND DEPRESSION: ICD-10-CM

## 2023-03-21 DIAGNOSIS — E55.9 VITAMIN D DEFICIENCY: ICD-10-CM

## 2023-03-21 DIAGNOSIS — E78.2 MIXED HYPERLIPIDEMIA: ICD-10-CM

## 2023-03-21 DIAGNOSIS — Z82.49 FAMILY HISTORY OF EARLY CAD: ICD-10-CM

## 2023-03-21 DIAGNOSIS — Z13.6 SCREENING, ISCHEMIC HEART DISEASE: ICD-10-CM

## 2023-03-21 PROBLEM — E66.01 MORBID (SEVERE) OBESITY DUE TO EXCESS CALORIES (HCC): Status: ACTIVE | Noted: 2023-03-21

## 2023-03-21 LAB
ALBUMIN SERPL-MCNC: 3.8 G/DL (ref 3.4–5)
ALBUMIN/GLOB SERPL: 1.1 {RATIO} (ref 1–2)
ALP LIVER SERPL-CCNC: 67 U/L
ALT SERPL-CCNC: 55 U/L
ANION GAP SERPL CALC-SCNC: 9 MMOL/L (ref 0–18)
AST SERPL-CCNC: 22 U/L (ref 15–37)
ATRIAL RATE: 95 BPM
BILIRUB SERPL-MCNC: 0.5 MG/DL (ref 0.1–2)
BILIRUB UR QL: NEGATIVE
BUN BLD-MCNC: 12 MG/DL (ref 7–18)
BUN/CREAT SERPL: 11.5 (ref 10–20)
CALCIUM BLD-MCNC: 9 MG/DL (ref 8.5–10.1)
CHLORIDE SERPL-SCNC: 109 MMOL/L (ref 98–112)
CHOLEST SERPL-MCNC: 152 MG/DL (ref ?–200)
CLARITY UR: CLEAR
CO2 SERPL-SCNC: 25 MMOL/L (ref 21–32)
COLOR UR: YELLOW
COMPLEXED PSA SERPL-MCNC: 0.68 NG/ML (ref ?–4)
CREAT BLD-MCNC: 1.04 MG/DL
DEPRECATED RDW RBC AUTO: 41.9 FL (ref 35.1–46.3)
ERYTHROCYTE [DISTWIDTH] IN BLOOD BY AUTOMATED COUNT: 12.8 % (ref 11–15)
EST. AVERAGE GLUCOSE BLD GHB EST-MCNC: 134 MG/DL (ref 68–126)
FASTING PATIENT LIPID ANSWER: YES
FASTING STATUS PATIENT QL REPORTED: YES
GFR SERPLBLD BASED ON 1.73 SQ M-ARVRAT: 93 ML/MIN/1.73M2 (ref 60–?)
GLOBULIN PLAS-MCNC: 3.4 G/DL (ref 2.8–4.4)
GLUCOSE BLD-MCNC: 132 MG/DL (ref 70–99)
GLUCOSE UR-MCNC: NEGATIVE MG/DL
HBA1C MFR BLD: 6.3 % (ref ?–5.7)
HCT VFR BLD AUTO: 43.5 %
HDLC SERPL-MCNC: 32 MG/DL (ref 40–59)
HGB BLD-MCNC: 14.8 G/DL
HGB UR QL STRIP.AUTO: NEGATIVE
HYALINE CASTS #/AREA URNS AUTO: PRESENT /LPF
LDLC SERPL CALC-MCNC: 84 MG/DL (ref ?–100)
LEUKOCYTE ESTERASE UR QL STRIP.AUTO: NEGATIVE
MCH RBC QN AUTO: 30.1 PG (ref 26–34)
MCHC RBC AUTO-ENTMCNC: 34 G/DL (ref 31–37)
MCV RBC AUTO: 88.4 FL
NITRITE UR QL STRIP.AUTO: NEGATIVE
NONHDLC SERPL-MCNC: 120 MG/DL (ref ?–130)
OSMOLALITY SERPL CALC.SUM OF ELEC: 298 MOSM/KG (ref 275–295)
P AXIS: 55 DEGREES
P-R INTERVAL: 164 MS
PH UR: 5 [PH] (ref 5–8)
PLATELET # BLD AUTO: 215 10(3)UL (ref 150–450)
POTASSIUM SERPL-SCNC: 4 MMOL/L (ref 3.5–5.1)
PROT SERPL-MCNC: 7.2 G/DL (ref 6.4–8.2)
PROT UR-MCNC: 30 MG/DL
Q-T INTERVAL: 374 MS
QRS DURATION: 88 MS
QTC CALCULATION (BEZET): 469 MS
R AXIS: 41 DEGREES
RBC # BLD AUTO: 4.92 X10(6)UL
SODIUM SERPL-SCNC: 143 MMOL/L (ref 136–145)
SP GR UR STRIP: 1.03 (ref 1–1.03)
T AXIS: 67 DEGREES
TRIGL SERPL-MCNC: 211 MG/DL (ref 30–149)
TSI SER-ACNC: 1.64 MIU/ML (ref 0.36–3.74)
UROBILINOGEN UR STRIP-ACNC: 2
VENTRICULAR RATE: 95 BPM
VIT C UR-MCNC: 40 MG/DL
VIT D+METAB SERPL-MCNC: 15.3 NG/ML (ref 30–100)
VLDLC SERPL CALC-MCNC: 34 MG/DL (ref 0–30)
WBC # BLD AUTO: 7 X10(3) UL (ref 4–11)

## 2023-03-21 PROCEDURE — 93010 ELECTROCARDIOGRAM REPORT: CPT | Performed by: STUDENT IN AN ORGANIZED HEALTH CARE EDUCATION/TRAINING PROGRAM

## 2023-03-21 PROCEDURE — 81001 URINALYSIS AUTO W/SCOPE: CPT | Performed by: INTERNAL MEDICINE

## 2023-03-21 PROCEDURE — 3008F BODY MASS INDEX DOCD: CPT | Performed by: INTERNAL MEDICINE

## 2023-03-21 PROCEDURE — 3074F SYST BP LT 130 MM HG: CPT | Performed by: INTERNAL MEDICINE

## 2023-03-21 PROCEDURE — 85027 COMPLETE CBC AUTOMATED: CPT | Performed by: INTERNAL MEDICINE

## 2023-03-21 PROCEDURE — 80061 LIPID PANEL: CPT | Performed by: INTERNAL MEDICINE

## 2023-03-21 PROCEDURE — 93005 ELECTROCARDIOGRAM TRACING: CPT

## 2023-03-21 PROCEDURE — 99396 PREV VISIT EST AGE 40-64: CPT | Performed by: INTERNAL MEDICINE

## 2023-03-21 PROCEDURE — 83036 HEMOGLOBIN GLYCOSYLATED A1C: CPT | Performed by: INTERNAL MEDICINE

## 2023-03-21 PROCEDURE — 82306 VITAMIN D 25 HYDROXY: CPT | Performed by: INTERNAL MEDICINE

## 2023-03-21 PROCEDURE — 3078F DIAST BP <80 MM HG: CPT | Performed by: INTERNAL MEDICINE

## 2023-03-21 PROCEDURE — 84443 ASSAY THYROID STIM HORMONE: CPT | Performed by: INTERNAL MEDICINE

## 2023-03-21 PROCEDURE — 80053 COMPREHEN METABOLIC PANEL: CPT | Performed by: INTERNAL MEDICINE

## 2023-03-21 PROCEDURE — 36415 COLL VENOUS BLD VENIPUNCTURE: CPT | Performed by: INTERNAL MEDICINE

## 2023-03-21 RX ORDER — ESCITALOPRAM OXALATE 20 MG/1
20 TABLET ORAL EVERY MORNING
Qty: 90 TABLET | Refills: 9 | Status: SHIPPED | OUTPATIENT
Start: 2023-03-21

## 2023-03-21 RX ORDER — ROSUVASTATIN CALCIUM 10 MG/1
10 TABLET, COATED ORAL NIGHTLY
Qty: 90 TABLET | Refills: 9 | Status: SHIPPED | OUTPATIENT
Start: 2023-03-21

## 2023-03-21 RX ORDER — TRAZODONE HYDROCHLORIDE 50 MG/1
50 TABLET ORAL NIGHTLY
Qty: 90 TABLET | Refills: 3 | Status: SHIPPED | OUTPATIENT
Start: 2023-03-21

## 2023-03-21 RX ORDER — TELMISARTAN 80 MG/1
80 TABLET ORAL NIGHTLY
Qty: 90 TABLET | Refills: 9 | Status: SHIPPED | OUTPATIENT
Start: 2023-03-21

## 2023-04-14 ENCOUNTER — OFFICE VISIT (OUTPATIENT)
Dept: ENDOCRINOLOGY CLINIC | Facility: CLINIC | Age: 43
End: 2023-04-14

## 2023-04-14 VITALS
HEIGHT: 74 IN | SYSTOLIC BLOOD PRESSURE: 116 MMHG | WEIGHT: 274 LBS | DIASTOLIC BLOOD PRESSURE: 81 MMHG | HEART RATE: 96 BPM | BODY MASS INDEX: 35.16 KG/M2

## 2023-04-14 DIAGNOSIS — E88.81 INSULIN RESISTANCE: ICD-10-CM

## 2023-04-14 DIAGNOSIS — R73.03 PREDIABETES: Primary | ICD-10-CM

## 2023-04-14 DIAGNOSIS — E66.9 OBESITY (BMI 35.0-39.9 WITHOUT COMORBIDITY): ICD-10-CM

## 2023-04-14 LAB
CARTRIDGE LOT#: ABNORMAL NUMERIC
GLUCOSE BLOOD: 122
HEMOGLOBIN A1C: 6.3 % (ref 4.3–5.6)
TEST STRIP LOT #: NORMAL NUMERIC

## 2023-04-14 PROCEDURE — 3074F SYST BP LT 130 MM HG: CPT | Performed by: NURSE PRACTITIONER

## 2023-04-14 PROCEDURE — 99214 OFFICE O/P EST MOD 30 MIN: CPT | Performed by: NURSE PRACTITIONER

## 2023-04-14 PROCEDURE — 3079F DIAST BP 80-89 MM HG: CPT | Performed by: NURSE PRACTITIONER

## 2023-04-14 PROCEDURE — 82947 ASSAY GLUCOSE BLOOD QUANT: CPT | Performed by: NURSE PRACTITIONER

## 2023-04-14 PROCEDURE — 83036 HEMOGLOBIN GLYCOSYLATED A1C: CPT | Performed by: NURSE PRACTITIONER

## 2023-04-14 PROCEDURE — 3008F BODY MASS INDEX DOCD: CPT | Performed by: NURSE PRACTITIONER

## 2023-04-14 RX ORDER — SEMAGLUTIDE 0.25 MG/.5ML
0.25 INJECTION, SOLUTION SUBCUTANEOUS WEEKLY
Qty: 4 EACH | Refills: 0 | Status: SHIPPED | OUTPATIENT
Start: 2023-04-14 | End: 2023-04-14

## 2023-04-14 RX ORDER — SEMAGLUTIDE 0.5 MG/.5ML
0.5 INJECTION, SOLUTION SUBCUTANEOUS WEEKLY
Qty: 4 EACH | Refills: 0 | Status: SHIPPED | OUTPATIENT
Start: 2023-05-05 | End: 2023-04-14

## 2023-04-14 RX ORDER — SEMAGLUTIDE 0.25 MG/.5ML
0.25 INJECTION, SOLUTION SUBCUTANEOUS WEEKLY
Qty: 4 EACH | Refills: 0 | COMMUNITY
Start: 2023-04-14

## 2023-04-14 RX ORDER — SEMAGLUTIDE 0.68 MG/ML
0.25 INJECTION, SOLUTION SUBCUTANEOUS WEEKLY
Qty: 9 ML | Refills: 0 | Status: SHIPPED | OUTPATIENT
Start: 2023-04-14

## 2023-04-14 NOTE — PATIENT INSTRUCTIONS
A1C: 6.3% on 3/21/2023 --> increased from 5.9% on 6/11/2021   Blood glucose: 122 in clinic today    Medications:   Start wegovy 0.25mg once weekly for 4 weeks, then increase dose to 0.5mg once weekly       -lets work on limiting carbohydrates to 45-60gm per meal/ max 180gm per day    - lets work on limiting cooked rice/pasta to 1 cup max per meal   - substitute white bread for whole wheat or keto bread   - may try pop-pables or pop-corners or rice crisps or popcorn for snack   - try eating more barries for fruit or 1/2 apple or 1/2 orange     - increase bike exercise to 5 days per week for at least 30 mins each session    -avoid going more than 2 consecutive days of no exercise    Weight:  Wt Readings from Last 6 Encounters:  04/14/23 : 274 lb (124.3 kg)  03/21/23 : 276 lb (125.2 kg)  03/03/22 : 250 lb (113.4 kg)  01/18/22 : 250 lb (113.4 kg)  12/17/21 : 250 lb (113.4 kg)  12/14/21 : 256 lb (116.1 kg)    A1C goal:  <5.7%

## 2023-04-19 ENCOUNTER — TELEPHONE (OUTPATIENT)
Dept: ENDOCRINOLOGY CLINIC | Facility: CLINIC | Age: 43
End: 2023-04-19

## 2023-04-19 RX ORDER — PHENTERMINE HYDROCHLORIDE 30 MG/1
30 CAPSULE ORAL EVERY MORNING
Qty: 90 CAPSULE | Refills: 0 | Status: SHIPPED | OUTPATIENT
Start: 2023-05-10 | End: 2023-04-20

## 2023-04-19 RX ORDER — PHENTERMINE HYDROCHLORIDE 15 MG/1
15 CAPSULE ORAL EVERY MORNING
Qty: 30 CAPSULE | Refills: 0 | Status: SHIPPED | OUTPATIENT
Start: 2023-04-19 | End: 2023-04-20

## 2023-04-19 NOTE — TELEPHONE ENCOUNTER
Alejandra Nguyen does not cover Ozempic without a diagnosis of DM 2. We could still attempt PA or patient could try [facility]? Please advise.

## 2023-04-19 NOTE — TELEPHONE ENCOUNTER
[id number] Piedad Gonzalez noted. wegovy was also not covered. It was plan exclusion. Lets start patient on phentermine 15mg daily in AM for 30 days, then increase to 30mg daily. rx sent to pharmacy and patient was notified. Thank you!

## 2023-04-19 NOTE — TELEPHONE ENCOUNTER
Patient called to inform us that he was not able to  ozempic from pharmacy. Please follow up with pharmacy and see if we should complete PA. Thank you!

## 2023-04-20 ENCOUNTER — HOSPITAL ENCOUNTER (OUTPATIENT)
Dept: CT IMAGING | Age: 43
Discharge: HOME | End: 2023-04-20
Attending: INTERNAL MEDICINE

## 2023-04-20 DIAGNOSIS — Z13.6 SCREENING, ISCHEMIC HEART DISEASE: ICD-10-CM

## 2023-04-20 RX ORDER — PHENTERMINE HYDROCHLORIDE 30 MG/1
30 CAPSULE ORAL EVERY MORNING
Qty: 90 CAPSULE | Refills: 0 | Status: SHIPPED | OUTPATIENT
Start: 2023-05-10

## 2023-04-20 RX ORDER — PHENTERMINE HYDROCHLORIDE 15 MG/1
15 CAPSULE ORAL EVERY MORNING
Qty: 30 CAPSULE | Refills: 0 | Status: SHIPPED | OUTPATIENT
Start: 2023-04-20

## 2023-04-20 NOTE — PROGRESS NOTES
Date of Service 4/20/2023    Mena Camarena  Date of Birth 6/17/1980    Patient Age: 38year old    PCP: Delaney Gerardo MD  [address] [place], [place] [id number]    Consult Type  Type Scan/Screening: Heart Scan  Preliminary Heart Scan Score: 0                Body Mass Index  There is no height or weight on file to calculate BMI. Lipid Profile  Cholesterol: 152, done on 3/21/2023. HDL Cholesterol: 32, done on 3/21/2023. LDL Cholesterol: 84, done on 3/21/2023. TriGlycerides 211, done on 3/21/2023. He is on a statin. Nurse Review  Risk factor information and results reviewed with Nurse: Yes     /81 he is on BP med. A1c from 4/14/23 was 6.3. He is to start medication for this. Father had heart scan done several years ago with high calcium score. He had stress test and angiogram done - no intervention, medical management. Patient also has sleep apnea. Recommended Follow Up:  Consult your physician regarding[de-identified] Final Heart Scan Report; Discuss potential for Incidental Finding    No data recorded      Recommendations for Change:  Nutrition Changes: Low Saturated Fat  Cholesterol Modification (goal of therapy depends upon your risk): Increase HDL (Healthy/Good) Normal >45 Men >55 Women;Decrease Triglycerides (Ugly) Normal <150  Exercise: Enhance Current Program  Smoking Cessation: No Change Needed  Weight Management: Decrease Current Weight  Stress Management: Adopt Stress Management Techniques  Repeat Heart Scan: 5 years if Calcium Score is 0. 0; Discuss with your Physician          Malcolm Recommended Resources:  Recommended Resources: Upcoming Classes, Medical Services and Health Library [email or web address]. Nikolay Lehman RN        Please Contact the Nurse Heart Line with any Questions or Concerns [phone].

## 2023-04-24 RX ORDER — METFORMIN HYDROCHLORIDE 500 MG/1
500 TABLET, EXTENDED RELEASE ORAL 2 TIMES DAILY WITH MEALS
Qty: 180 TABLET | Refills: 0 | Status: SHIPPED | OUTPATIENT
Start: 2023-04-24 | End: 2023-07-23

## 2023-04-24 NOTE — TELEPHONE ENCOUNTER
Discussed with patient that we will start him on Metformin ER 500mg twice daily for prediabetes. - continue with phentermine 15mg once daily for 30 days, then increase to 30mg once daily for weight loss.

## 2023-06-27 ENCOUNTER — OFFICE VISIT (OUTPATIENT)
Dept: INTERNAL MEDICINE CLINIC | Facility: CLINIC | Age: 43
End: 2023-06-27

## 2023-06-27 VITALS
WEIGHT: 260 LBS | HEART RATE: 105 BPM | DIASTOLIC BLOOD PRESSURE: 72 MMHG | BODY MASS INDEX: 33.37 KG/M2 | SYSTOLIC BLOOD PRESSURE: 106 MMHG | HEIGHT: 74 IN

## 2023-06-27 DIAGNOSIS — K76.0 HEPATIC STEATOSIS: ICD-10-CM

## 2023-06-27 DIAGNOSIS — R91.1 LUNG NODULE SEEN ON IMAGING STUDY: Primary | ICD-10-CM

## 2023-06-27 PROCEDURE — 99213 OFFICE O/P EST LOW 20 MIN: CPT | Performed by: INTERNAL MEDICINE

## 2023-06-27 PROCEDURE — 3008F BODY MASS INDEX DOCD: CPT | Performed by: INTERNAL MEDICINE

## 2023-06-27 PROCEDURE — 3074F SYST BP LT 130 MM HG: CPT | Performed by: INTERNAL MEDICINE

## 2023-06-27 PROCEDURE — 3078F DIAST BP <80 MM HG: CPT | Performed by: INTERNAL MEDICINE

## 2023-07-19 ENCOUNTER — TELEPHONE (OUTPATIENT)
Dept: INTERNAL MEDICINE CLINIC | Facility: CLINIC | Age: 43
End: 2023-07-19

## 2023-07-19 DIAGNOSIS — E66.9 OBESITY (BMI 30-39.9): Primary | ICD-10-CM

## 2023-07-19 RX ORDER — TIRZEPATIDE 7.5 MG/.5ML
7.5 INJECTION, SOLUTION SUBCUTANEOUS WEEKLY
Qty: 3 ML | Refills: 1 | Status: SHIPPED | OUTPATIENT
Start: 2023-09-18 | End: 2023-07-19

## 2023-07-19 RX ORDER — TIRZEPATIDE 10 MG/.5ML
10 INJECTION, SOLUTION SUBCUTANEOUS WEEKLY
Qty: 3 ML | Refills: 1 | Status: SHIPPED | OUTPATIENT
Start: 2023-10-18

## 2023-07-19 RX ORDER — TIRZEPATIDE 7.5 MG/.5ML
7.5 INJECTION, SOLUTION SUBCUTANEOUS WEEKLY
Qty: 3 ML | Refills: 1 | Status: SHIPPED | OUTPATIENT
Start: 2023-09-18 | End: 2023-10-18

## 2023-07-19 RX ORDER — TIRZEPATIDE 5 MG/.5ML
5 INJECTION, SOLUTION SUBCUTANEOUS WEEKLY
Qty: 3 ML | Refills: 1 | Status: SHIPPED | OUTPATIENT
Start: 2023-08-18 | End: 2023-09-17

## 2023-07-19 RX ORDER — TIRZEPATIDE 10 MG/.5ML
10 INJECTION, SOLUTION SUBCUTANEOUS WEEKLY
Qty: 3 ML | Refills: 1 | Status: SHIPPED | OUTPATIENT
Start: 2023-10-18 | End: 2023-07-19

## 2023-07-19 RX ORDER — TIRZEPATIDE 5 MG/.5ML
5 INJECTION, SOLUTION SUBCUTANEOUS WEEKLY
Qty: 3 ML | Refills: 1 | Status: SHIPPED | OUTPATIENT
Start: 2023-08-18 | End: 2023-07-19

## 2023-09-15 ENCOUNTER — OFFICE VISIT (OUTPATIENT)
Dept: ENDOCRINOLOGY CLINIC | Facility: CLINIC | Age: 43
End: 2023-09-15

## 2023-09-15 VITALS
WEIGHT: 264 LBS | HEART RATE: 111 BPM | SYSTOLIC BLOOD PRESSURE: 99 MMHG | DIASTOLIC BLOOD PRESSURE: 67 MMHG | HEIGHT: 74.02 IN | BODY MASS INDEX: 33.88 KG/M2

## 2023-09-15 DIAGNOSIS — R73.03 PREDIABETES: Primary | ICD-10-CM

## 2023-09-15 DIAGNOSIS — I10 HYPERTENSION GOAL BP (BLOOD PRESSURE) < 130/80: ICD-10-CM

## 2023-09-15 LAB
CARTRIDGE LOT#: NORMAL NUMERIC
GLUCOSE BLOOD: 115
HEMOGLOBIN A1C: 5.6 % (ref 4.3–5.6)
TEST STRIP LOT #: NORMAL NUMERIC

## 2023-09-15 PROCEDURE — 82947 ASSAY GLUCOSE BLOOD QUANT: CPT | Performed by: NURSE PRACTITIONER

## 2023-09-15 PROCEDURE — 3008F BODY MASS INDEX DOCD: CPT | Performed by: NURSE PRACTITIONER

## 2023-09-15 PROCEDURE — 3078F DIAST BP <80 MM HG: CPT | Performed by: NURSE PRACTITIONER

## 2023-09-15 PROCEDURE — 3074F SYST BP LT 130 MM HG: CPT | Performed by: NURSE PRACTITIONER

## 2023-09-15 PROCEDURE — 83036 HEMOGLOBIN GLYCOSYLATED A1C: CPT | Performed by: NURSE PRACTITIONER

## 2023-09-15 PROCEDURE — 99213 OFFICE O/P EST LOW 20 MIN: CPT | Performed by: NURSE PRACTITIONER

## 2023-11-21 ENCOUNTER — TELEPHONE (OUTPATIENT)
Dept: INTERNAL MEDICINE CLINIC | Facility: CLINIC | Age: 43
End: 2023-11-21

## 2023-11-21 NOTE — TELEPHONE ENCOUNTER
Pharmacy requesting refill    Current Outpatient Medications   Medication Sig Dispense Refill    Tirzepatide (MOUNJARO) 10 MG/0.5ML Subcutaneous Solution Pen-injector Inject 10 mg into the skin once a week.  3 mL 1

## 2023-12-07 ENCOUNTER — OFFICE VISIT (OUTPATIENT)
Facility: LOCATION | Age: 43
End: 2023-12-07

## 2023-12-07 ENCOUNTER — LAB ENCOUNTER (OUTPATIENT)
Dept: LAB | Age: 43
End: 2023-12-07
Attending: INTERNAL MEDICINE
Payer: COMMERCIAL

## 2023-12-07 VITALS
DIASTOLIC BLOOD PRESSURE: 68 MMHG | HEART RATE: 76 BPM | BODY MASS INDEX: 35.16 KG/M2 | HEIGHT: 74 IN | OXYGEN SATURATION: 98 % | SYSTOLIC BLOOD PRESSURE: 118 MMHG | WEIGHT: 274 LBS

## 2023-12-07 DIAGNOSIS — R73.03 PREDIABETES: ICD-10-CM

## 2023-12-07 DIAGNOSIS — Z02.1 PRE-EMPLOYMENT HEALTH SCREENING EXAMINATION: ICD-10-CM

## 2023-12-07 DIAGNOSIS — Z02.1 PRE-EMPLOYMENT HEALTH SCREENING EXAMINATION: Primary | ICD-10-CM

## 2023-12-07 LAB
AMPHET UR QL SCN: NEGATIVE
BARBITURATES UR QL SCN: NEGATIVE
BENZODIAZ UR QL SCN: NEGATIVE
CANNABINOIDS UR QL SCN: NEGATIVE
COCAINE UR QL: NEGATIVE
CREAT UR-SCNC: 239.4 MG/DL
HBV SURFACE AB SER QL: REACTIVE
HBV SURFACE AB SERPL IA-ACNC: 143.45 MIU/ML
HBV SURFACE AG SER-ACNC: <0.1 [IU]/L
HBV SURFACE AG SERPL QL IA: NONREACTIVE
MDMA UR QL SCN: NEGATIVE
METHADONE UR QL SCN: NEGATIVE
OPIATES UR QL SCN: NEGATIVE
OXYCODONE UR QL SCN: NEGATIVE
PCP UR QL SCN: NEGATIVE
RUBV IGG SER QL: POSITIVE
RUBV IGG SER-ACNC: 70.4 IU/ML (ref 10–?)

## 2023-12-07 PROCEDURE — 87340 HEPATITIS B SURFACE AG IA: CPT

## 2023-12-07 PROCEDURE — 86480 TB TEST CELL IMMUN MEASURE: CPT

## 2023-12-07 PROCEDURE — 83036 HEMOGLOBIN GLYCOSYLATED A1C: CPT

## 2023-12-07 PROCEDURE — 36415 COLL VENOUS BLD VENIPUNCTURE: CPT

## 2023-12-07 PROCEDURE — 80307 DRUG TEST PRSMV CHEM ANLYZR: CPT

## 2023-12-07 PROCEDURE — 86787 VARICELLA-ZOSTER ANTIBODY: CPT

## 2023-12-07 PROCEDURE — 86706 HEP B SURFACE ANTIBODY: CPT

## 2023-12-07 PROCEDURE — 86735 MUMPS ANTIBODY: CPT

## 2023-12-07 PROCEDURE — 86762 RUBELLA ANTIBODY: CPT

## 2023-12-07 PROCEDURE — 99213 OFFICE O/P EST LOW 20 MIN: CPT | Performed by: INTERNAL MEDICINE

## 2023-12-07 PROCEDURE — 3074F SYST BP LT 130 MM HG: CPT | Performed by: INTERNAL MEDICINE

## 2023-12-07 PROCEDURE — 3008F BODY MASS INDEX DOCD: CPT | Performed by: INTERNAL MEDICINE

## 2023-12-07 PROCEDURE — 86765 RUBEOLA ANTIBODY: CPT

## 2023-12-07 PROCEDURE — 3078F DIAST BP <80 MM HG: CPT | Performed by: INTERNAL MEDICINE

## 2023-12-08 LAB
EST. AVERAGE GLUCOSE BLD GHB EST-MCNC: 120 MG/DL (ref 68–126)
HBA1C MFR BLD: 5.8 % (ref ?–5.7)
MEV IGG SER-ACNC: 27.4 AU/ML (ref 16.5–?)
MUV IGG SER IA-ACNC: <5 AU/ML (ref 11–?)
VZV IGG SER IA-ACNC: 2050 (ref 165–?)

## 2023-12-11 LAB
M TB IFN-G CD4+ T-CELLS BLD-ACNC: 0 IU/ML
M TB TUBERC IFN-G BLD QL: NEGATIVE
M TB TUBERC IGNF/MITOGEN IGNF CONTROL: >10 IU/ML
QFT TB1 AG MINUS NIL: 0.05 IU/ML
QFT TB2 AG MINUS NIL: 0.01 IU/ML

## 2023-12-15 ENCOUNTER — NURSE ONLY (OUTPATIENT)
Dept: INTERNAL MEDICINE CLINIC | Facility: CLINIC | Age: 43
End: 2023-12-15
Payer: COMMERCIAL

## 2023-12-15 DIAGNOSIS — Z23 IMMUNIZATION DUE: Primary | ICD-10-CM

## 2023-12-15 PROCEDURE — 90471 IMMUNIZATION ADMIN: CPT | Performed by: INTERNAL MEDICINE

## 2023-12-15 PROCEDURE — 90707 MMR VACCINE SC: CPT | Performed by: INTERNAL MEDICINE

## 2023-12-18 ENCOUNTER — PATIENT MESSAGE (OUTPATIENT)
Facility: LOCATION | Age: 43
End: 2023-12-18

## 2024-01-16 RX ORDER — TIRZEPATIDE 15 MG/.5ML
15 INJECTION, SOLUTION SUBCUTANEOUS WEEKLY
Qty: 6 ML | Refills: 1 | Status: SHIPPED | OUTPATIENT
Start: 2024-01-16

## 2024-01-16 RX ORDER — TIRZEPATIDE 15 MG/.5ML
15 INJECTION, SOLUTION SUBCUTANEOUS WEEKLY
Qty: 2 ML | Refills: 0 | OUTPATIENT
Start: 2024-01-16

## 2024-08-16 ENCOUNTER — HOSPITAL ENCOUNTER (OUTPATIENT)
Age: 44
Discharge: HOME | End: 2024-08-16
Payer: COMMERCIAL

## 2024-08-16 VITALS
DIASTOLIC BLOOD PRESSURE: 83 MMHG | TEMPERATURE: 100 F | SYSTOLIC BLOOD PRESSURE: 148 MMHG | RESPIRATION RATE: 22 BRPM | OXYGEN SATURATION: 99 % | HEART RATE: 132 BPM

## 2024-08-16 DIAGNOSIS — U07.1 COVID: ICD-10-CM

## 2024-08-16 DIAGNOSIS — R50.9 FEVER: Primary | ICD-10-CM

## 2024-08-16 LAB
ATRIAL RATE: 124 BPM
P AXIS: 63 DEGREES
P-R INTERVAL: 160 MS
POCT INFLUENZA A: NEGATIVE
POCT INFLUENZA B: NEGATIVE
Q-T INTERVAL: 316 MS
QRS DURATION: 84 MS
QTC CALCULATION (BEZET): 453 MS
R AXIS: 73 DEGREES
S PYO AG THROAT QL: NEGATIVE
SARS-COV-2 RNA RESP QL NAA+PROBE: DETECTED
T AXIS: 85 DEGREES
VENTRICULAR RATE: 124 BPM

## 2024-08-16 PROCEDURE — 87502 INFLUENZA DNA AMP PROBE: CPT | Performed by: NURSE PRACTITIONER

## 2024-08-16 PROCEDURE — U0002 COVID-19 LAB TEST NON-CDC: HCPCS | Performed by: NURSE PRACTITIONER

## 2024-08-16 PROCEDURE — 99203 OFFICE O/P NEW LOW 30 MIN: CPT | Performed by: NURSE PRACTITIONER

## 2024-08-16 PROCEDURE — 87880 STREP A ASSAY W/OPTIC: CPT | Performed by: NURSE PRACTITIONER

## 2024-08-16 PROCEDURE — 93000 ELECTROCARDIOGRAM COMPLETE: CPT | Performed by: NURSE PRACTITIONER

## 2024-08-16 NOTE — ED INITIAL ASSESSMENT (HPI)
Patient states he has had a sore throat, cough, tightness in his chest, and a headache since yesterday afternoon. Patient states he had a fever of 102.8 that started today. Patient states he has been coughing up green phlegm.

## 2024-08-16 NOTE — ED PROVIDER NOTES
Patient Seen in: [facility]      History     Chief Complaint   Patient presents with    Fever    Cough/URI     Stated Complaint: Sore Throat; Cough    Subjective:   HPI    54-year-old male presents with sore throat cough and fever that started yesterday.  Patient states he did a home COVID test this morning that he states was negative.  There is no nausea or vomiting he has low-grade fever in immediate care patient denies chest pain feels like tightness in his chest with breathing.    Objective:   Past Medical History:    Allergic rhinitis    Cancer (HCC)    melanoma of right right; eye removed    Hypercholesterolemia with hypertriglyceridemia    Sleep apnea              No pertinent past surgical history.              No pertinent social history.            Review of Systems    Positive for stated Chief Complaint: Fever and Cough/URI    Other systems are as noted in HPI.  Constitutional and vital signs reviewed.      All other systems reviewed and negative except as noted above.    Physical Exam     ED Triage Vitals [08/16/24 1415]   /83   Pulse (!) 132   Resp 22   Temp 100.3 °F (37.9 °C)   Temp src Temporal   SpO2 99 %   O2 Device None (Room air)       Current Vitals:   Vital Signs  BP: 148/83  Pulse: (!) 132  Resp: 22  Temp: 100.3 °F (37.9 °C)  Temp src: Temporal    Oxygen Therapy  SpO2: 99 %  O2 Device: None (Room air)            Physical Exam          ED Course     Labs Reviewed   RAPID SARS-COV-2 BY PCR - Abnormal; Notable for the following components:       Result Value    Rapid SARS-CoV-2 by PCR Detected (*)     All other components within normal limits   POCT RAPID STREP - Normal   POCT FLU TEST - Normal    Narrative:     This assay is a rapid molecular in vitro test utilizing nucleic acid amplification of influenza A and B viral RNA.     EKG    POCT Flu Test        Specimen Information: Nares; Other      Component Value Flag Ref Range Units Status    POCT INFLUENZA A Negative       Negative  Final    POCT INFLUENZA B Negative      Negative  Final                  Rapid SARS-CoV-2 by PCR        Specimen Information: Nares; Other      Component Value Flag Ref Range Units Status    Rapid SARS-CoV-2 by PCR Detected      Not Detected  Final    Comment:    This test is intended for the qualitative detection of nucleic acid from the SARS-CoV-2 viral RNA from individuals who are suspected of COVID-19 infection by their healthcare provider.    A \"Detected\" result is considered a positive test result for COVID-19.   A \"Not Detected\" result for this test means that SARS-CoV-2 RNA was not present in the sample above the limit of detection of the assay.    Test performed using the Abbott ID NOW COVID-19 assay performed on the ID NOW Instrument, [employer] [place], [place] [address].    This test is being used under the Food and Drug Administration's Emergency Use Authorization.    The authorized Fact Sheet for Healthcare Providers for this assay is available upon request from the laboratory.    [facility]   [address] [place] [id number]   Phone: [phone]  Fax: [phone]  [facility] # [id number]                   EKG 12 Lead        Component Value Flag Ref Range Units Status    Ventricular rate 124       BPM Incomplete    Atrial rate 124       BPM Incomplete    P-R Interval 160       ms Incomplete    QRS Duration 84       ms Incomplete    Q-T Interval 316       ms Incomplete    QTC Calculation (Bezet) 453       ms Incomplete    P Axis 63       degrees Incomplete    R Axis 73       degrees Incomplete    T Axis 85       degrees Incomplete                 Sinus tachycardia  Otherwise normal ECG  When compared with ECG of 21-MAR-2023 11:52,  No significant change was found         POCT Rapid Strep        Component Value Flag Ref Range Units Status    POCT Rapid Strep Negative      Negative  Final                                           [facility]                                         Medical Decision Making  44-year-old male presents with sore throat congestion and chest tightness that started 1 day ago.  Differential diagnosis includes viral upper respiratory infection, COVID, strep pharyngitis, influenza.  Patient states he had fever at home as high as 102.  He states he took aspirin yesterday.  Patient appears in no acute distress.  Low grade fever in immediate care.  EKG was done and shows a rate of 124.  Otherwise unchanged from prior in MUSE.  Patient denies chest pain he also denies pain with inspiration.  His lungs are clear to auscultate.  POC strep is negative.  POC influenza is negative.  POC COVID is positive.  Results were discussed with patient.  He was given printed instructions for help with symptom relief.  He was also given instructions when to go to the emergency room.    Amount and/or Complexity of Data Reviewed  Labs: ordered.     Details: POC covid is positive  POC strep is negative  POC influenza is negative  ECG/medicine tests: ordered.     Details: EKG shows sinus tachy rate of 124.  Change in rate from prior EKG in MUSE.     Risk  OTC drugs.        Disposition and Plan     Clinical Impression:  1. Fever    2. COVID         Disposition:  Discharge  8/16/2024  2:52 pm    Follow-up:  Misha Gilliam MD  [address] [facility] [address]  [phone]      If symptoms worsen          Medications Prescribed:  Discharge Medication List as of 8/16/2024  2:52 PM

## 2025-05-13 NOTE — PROGRESS NOTES
-hydrocortisone suppository 2x daily for 1-2 weeks    -sitz bath 2x daily    -continue colace twice daily     -take miralax 2x daily     -use glycerin suppository to soften    -follow up in 3 months   [facility] Consultation Note       Patient:  Kelly Hoskins  :      1980  MRN:      [id number]    Referring Provider: Dr. Parker Tran       Chief Complaint:  Patient presents with:  Consult  Weight Management      SUBJECTIVE     Hi has no known allergies.      Comorbidities:  GERD    Social History:  Social History    Socioeconomic History      Marital status: Single      Spouse name: Not on file      Number of children: Not on file      Years of education: Not on file      [facility] ed patient does not use an assistive device. .        The patient does live in a home with stairs.     Surgical History:    Past Surgical History:   Procedure Laterality Date   • OTHER SURGICAL HISTORY      right eye removed due melanoma       Family History: Neck:   Supple, symmetrical, trachea midline, no adenopathy;        thyroid:  No enlargement/tenderness/nodules; no carotid    bruit or JVD   Back:     Symmetric, no curvature, ROM normal, no CVA tenderness   Lungs:     Clear to auscultation bilaterally, 06/14/2019 02:41 PM    TCHDLRDAPHNIEO 4.6 02/13/2015 08:30 AM       Goals for next month:  1. Keep a food log. 2. Drink 48-64 ounces of non-caloric beverages per day. No fruit juices or regular soda.   3. Increase activity-upper body exercises, walk 10 minutes

## 2025-08-04 ENCOUNTER — TELEPHONE (OUTPATIENT)
Dept: ENDOCRINOLOGY CLINIC | Facility: CLINIC | Age: 45
End: 2025-08-04

## 2025-08-04 DIAGNOSIS — E11.65 TYPE 2 DIABETES MELLITUS WITH HYPERGLYCEMIA, WITHOUT LONG-TERM CURRENT USE OF INSULIN (HCC): Primary | ICD-10-CM

## 2025-08-05 RX ORDER — TIRZEPATIDE 2.5 MG/.5ML
2.5 INJECTION, SOLUTION SUBCUTANEOUS WEEKLY
Qty: 2 ML | Refills: 0 | Status: SHIPPED | OUTPATIENT
Start: 2025-08-05 | End: 2025-08-11

## 2025-08-05 RX ORDER — TIRZEPATIDE 5 MG/.5ML
5 INJECTION, SOLUTION SUBCUTANEOUS WEEKLY
Qty: 2 ML | Refills: 0 | Status: SHIPPED | OUTPATIENT
Start: 2025-08-19 | End: 2025-08-11

## 2025-08-11 ENCOUNTER — TELEMEDICINE (OUTPATIENT)
Dept: ENDOCRINOLOGY CLINIC | Facility: CLINIC | Age: 45
End: 2025-08-11

## 2025-08-11 DIAGNOSIS — E11.65 TYPE 2 DIABETES MELLITUS WITH HYPERGLYCEMIA, WITHOUT LONG-TERM CURRENT USE OF INSULIN (HCC): Primary | ICD-10-CM

## 2025-08-11 RX ORDER — TIRZEPATIDE 5 MG/.5ML
5 INJECTION, SOLUTION SUBCUTANEOUS WEEKLY
Qty: 2 ML | Refills: 1 | Status: SHIPPED | OUTPATIENT
Start: 2025-08-11

## 2025-08-22 ENCOUNTER — TELEPHONE (OUTPATIENT)
Dept: ENDOCRINOLOGY CLINIC | Facility: CLINIC | Age: 45
End: 2025-08-22

## 2025-08-22 DIAGNOSIS — E11.65 TYPE 2 DIABETES MELLITUS WITH HYPERGLYCEMIA, WITHOUT LONG-TERM CURRENT USE OF INSULIN (HCC): Primary | ICD-10-CM

## 2025-08-22 RX ORDER — ACYCLOVIR 400 MG/1
1 TABLET ORAL
Qty: 9 EACH | Refills: 3 | Status: SHIPPED | OUTPATIENT
Start: 2025-08-22

## (undated) DIAGNOSIS — Z01.818 PREOP EXAMINATION: ICD-10-CM

## (undated) NOTE — Clinical Note
Erasmo Meraz todayHas been doing well on ketoCan you please order annual blood work for him next Chu

## (undated) NOTE — LETTER
[address]   Date:   3/3/2022     Name:   Burton Boyd    YOB: 1980   MRN:   [id number]       WHERE IS YOUR PAIN NOW? Mert the areas on your body where you feel the described sensations. Use the appropriate symbol. Amy Richardson the areas of radiation. Include all affected areas. Just to complete the picture, please draw in the face. ACHE:  ^ ^ ^   NUMBNESS:  0000   PINS & NEEDLES:  = = = =                              ^ ^ ^                       0000              = = = =                                    ^ ^ ^                       0000            = = = =      BURNING:  XXXX   STABBING: ////                  XXXX                ////                         XXXX          ////     Please mert the line below indicating your degree of pain right now  with 0 being no pain 10 being the worst pain possible.                                          0             1             2              3             4              5              6              7             8             9             10         Patient Signature:

## (undated) NOTE — LETTER
[address]   Date:   12/9/2019     Name:   Clem Briceño    YOB: 1980   MRN:   [id number]       WHERE IS YOUR PAIN NOW? Mert the areas on your body where you feel the described sensations.   Use the appropriate

## (undated) NOTE — Clinical Note
Aurora Medrano is doing very wellDown 15 poundsI recommend he continues doing keto. Add intermittent fasting if he gets stuck. No meds at this time. Will recommend to Miranda Marquez that he gets blood work done next month instead of [employer]

## (undated) NOTE — LETTER
AUTHORIZATION FOR SURGICAL OPERATION OR OTHER PROCEDURE    1. I hereby authorize Dr. Skylar Alicia and the [facility] staff assigned to my case to perform the following operation and/or procedure at the [facility]:        2.   My physician has explained the natphoenix the time of the procedure, I have explained to the patient and/or his/her guardian, the risks and benefits involved in the proposed treatment and any reasonable alternative to the proposed treatment.   I have also explained the risks and benefits involved i

## (undated) NOTE — LETTER
[address]   Date:   12/2/2019     Name:   Sherri Pearce    YOB: 1980   MRN:   [id number]       WHERE IS YOUR PAIN NOW? Mert the areas on your body where you feel the described sensations.   Use the appropriate

## (undated) NOTE — LETTER
Date & Time: 8/16/2024, 2:54 PM  Patient: Carlos Eduardo Feliciano  Encounter Provider(s):    Cande Bonner APRN       To Whom It May Concern:    Carlos Eduardo Feliciano was seen and treated in our department on 8/16/2024. He can return to work Aug 21, 2024.    If you have any questions or concerns, please do not hesitate to call.    Cande Bonner NP    _____________________________  Physician/APC Signature

## (undated) NOTE — LETTER
[address], [facility]   Date:   1/18/2022     Name:   Denzel Skinner    YOB: 1980   MRN:   [id number]       WHERE IS YOUR PAIN NOW?   Mert the areas on your body where you feel the desc

## (undated) NOTE — LETTER
[address]   Date:   6/28/2021     Name:   Andrea Daniel    YOB: 1980   MRN:   [id number]       WHERE IS YOUR PAIN NOW? Mert the areas on your body where you feel the described sensations.   Use the appropriate

## (undated) NOTE — LETTER
[address]   Date:   4/27/2021     Name:   Mohinder Del Valle    YOB: 1980   MRN:   [id number]       WHERE IS YOUR PAIN NOW? Mert the areas on your body where you feel the described sensations.   Use the appropriate